# Patient Record
Sex: MALE | Race: WHITE | NOT HISPANIC OR LATINO | Employment: UNEMPLOYED | URBAN - METROPOLITAN AREA
[De-identification: names, ages, dates, MRNs, and addresses within clinical notes are randomized per-mention and may not be internally consistent; named-entity substitution may affect disease eponyms.]

---

## 2023-05-10 PROBLEM — E21.0 PRIMARY HYPERPARATHYROIDISM (MULTI): Status: ACTIVE | Noted: 2023-05-10

## 2023-05-10 PROBLEM — N43.3 HYDROCELE, LEFT: Status: ACTIVE | Noted: 2023-05-10

## 2023-05-10 PROBLEM — I10 BENIGN ESSENTIAL HYPERTENSION: Status: ACTIVE | Noted: 2023-05-10

## 2023-05-10 PROBLEM — E55.9 VITAMIN D DEFICIENCY: Status: ACTIVE | Noted: 2023-05-10

## 2023-05-10 PROBLEM — K21.9 GERD (GASTROESOPHAGEAL REFLUX DISEASE): Status: ACTIVE | Noted: 2023-05-10

## 2023-05-10 PROBLEM — M19.90 GENERALIZED ARTHRITIS: Status: ACTIVE | Noted: 2023-05-10

## 2023-05-10 PROBLEM — G47.00 INSOMNIA: Status: ACTIVE | Noted: 2023-05-10

## 2023-07-18 ENCOUNTER — LAB (OUTPATIENT)
Dept: LAB | Facility: LAB | Age: 38
End: 2023-07-18
Payer: COMMERCIAL

## 2023-07-18 ENCOUNTER — OFFICE VISIT (OUTPATIENT)
Dept: PRIMARY CARE | Facility: CLINIC | Age: 38
End: 2023-07-18
Payer: COMMERCIAL

## 2023-07-18 VITALS
HEIGHT: 67 IN | WEIGHT: 220 LBS | HEART RATE: 84 BPM | SYSTOLIC BLOOD PRESSURE: 124 MMHG | OXYGEN SATURATION: 98 % | DIASTOLIC BLOOD PRESSURE: 84 MMHG | BODY MASS INDEX: 34.53 KG/M2

## 2023-07-18 DIAGNOSIS — E21.0 PRIMARY HYPERPARATHYROIDISM (MULTI): ICD-10-CM

## 2023-07-18 DIAGNOSIS — I10 BENIGN ESSENTIAL HYPERTENSION: ICD-10-CM

## 2023-07-18 DIAGNOSIS — E55.9 VITAMIN D DEFICIENCY: ICD-10-CM

## 2023-07-18 DIAGNOSIS — K21.9 GASTROESOPHAGEAL REFLUX DISEASE WITHOUT ESOPHAGITIS: ICD-10-CM

## 2023-07-18 DIAGNOSIS — M19.90 GENERALIZED ARTHRITIS: ICD-10-CM

## 2023-07-18 DIAGNOSIS — I10 BENIGN ESSENTIAL HYPERTENSION: Primary | ICD-10-CM

## 2023-07-18 LAB
ALANINE AMINOTRANSFERASE (SGPT) (U/L) IN SER/PLAS: 147 U/L (ref 10–52)
ALBUMIN (G/DL) IN SER/PLAS: 4.5 G/DL (ref 3.4–5)
ALKALINE PHOSPHATASE (U/L) IN SER/PLAS: 87 U/L (ref 33–120)
ANION GAP IN SER/PLAS: 13 MMOL/L (ref 10–20)
ASPARTATE AMINOTRANSFERASE (SGOT) (U/L) IN SER/PLAS: 67 U/L (ref 9–39)
BILIRUBIN TOTAL (MG/DL) IN SER/PLAS: 1 MG/DL (ref 0–1.2)
CALCIUM (MG/DL) IN SER/PLAS: 10.4 MG/DL (ref 8.6–10.3)
CARBON DIOXIDE, TOTAL (MMOL/L) IN SER/PLAS: 28 MMOL/L (ref 21–32)
CHLORIDE (MMOL/L) IN SER/PLAS: 102 MMOL/L (ref 98–107)
CREATININE (MG/DL) IN SER/PLAS: 1.08 MG/DL (ref 0.5–1.3)
GFR MALE: 89 ML/MIN/1.73M2
GLUCOSE (MG/DL) IN SER/PLAS: 92 MG/DL (ref 74–99)
POTASSIUM (MMOL/L) IN SER/PLAS: 4.9 MMOL/L (ref 3.5–5.3)
PROTEIN TOTAL: 7.1 G/DL (ref 6.4–8.2)
SODIUM (MMOL/L) IN SER/PLAS: 138 MMOL/L (ref 136–145)
THYROTROPIN (MIU/L) IN SER/PLAS BY DETECTION LIMIT <= 0.05 MIU/L: 2.24 MIU/L (ref 0.44–3.98)
UREA NITROGEN (MG/DL) IN SER/PLAS: 14 MG/DL (ref 6–23)

## 2023-07-18 PROCEDURE — 3074F SYST BP LT 130 MM HG: CPT | Performed by: NURSE PRACTITIONER

## 2023-07-18 PROCEDURE — 3079F DIAST BP 80-89 MM HG: CPT | Performed by: NURSE PRACTITIONER

## 2023-07-18 PROCEDURE — 1036F TOBACCO NON-USER: CPT | Performed by: NURSE PRACTITIONER

## 2023-07-18 PROCEDURE — 84443 ASSAY THYROID STIM HORMONE: CPT

## 2023-07-18 PROCEDURE — 99214 OFFICE O/P EST MOD 30 MIN: CPT | Performed by: NURSE PRACTITIONER

## 2023-07-18 PROCEDURE — 80053 COMPREHEN METABOLIC PANEL: CPT

## 2023-07-18 PROCEDURE — 82306 VITAMIN D 25 HYDROXY: CPT

## 2023-07-18 PROCEDURE — 36415 COLL VENOUS BLD VENIPUNCTURE: CPT

## 2023-07-18 RX ORDER — LISINOPRIL 20 MG/1
20 TABLET ORAL DAILY
Qty: 90 TABLET | Refills: 2 | Status: SHIPPED | OUTPATIENT
Start: 2023-07-18 | End: 2024-01-08 | Stop reason: SDUPTHER

## 2023-07-18 RX ORDER — OMEPRAZOLE 40 MG/1
CAPSULE, DELAYED RELEASE ORAL
Qty: 90 CAPSULE | Refills: 2 | Status: SHIPPED | OUTPATIENT
Start: 2023-07-18 | End: 2024-01-08 | Stop reason: SDUPTHER

## 2023-07-18 ASSESSMENT — PATIENT HEALTH QUESTIONNAIRE - PHQ9
2. FEELING DOWN, DEPRESSED OR HOPELESS: NOT AT ALL
1. LITTLE INTEREST OR PLEASURE IN DOING THINGS: NOT AT ALL
SUM OF ALL RESPONSES TO PHQ9 QUESTIONS 1 AND 2: 0

## 2023-07-18 ASSESSMENT — ENCOUNTER SYMPTOMS
RESPIRATORY NEGATIVE: 1
PSYCHIATRIC NEGATIVE: 1
BACK PAIN: 1
CARDIOVASCULAR NEGATIVE: 1
NEUROLOGICAL NEGATIVE: 1
GASTROINTESTINAL NEGATIVE: 1
CONSTITUTIONAL NEGATIVE: 1

## 2023-07-18 ASSESSMENT — COLUMBIA-SUICIDE SEVERITY RATING SCALE - C-SSRS
2. HAVE YOU ACTUALLY HAD ANY THOUGHTS OF KILLING YOURSELF?: NO
1. IN THE PAST MONTH, HAVE YOU WISHED YOU WERE DEAD OR WISHED YOU COULD GO TO SLEEP AND NOT WAKE UP?: NO
6. HAVE YOU EVER DONE ANYTHING, STARTED TO DO ANYTHING, OR PREPARED TO DO ANYTHING TO END YOUR LIFE?: NO

## 2023-07-18 NOTE — PROGRESS NOTES
"Subjective   Patient ID: Manuel Humphreys is a 38 y.o. male who presents for Follow-up (6 month).    HTN- stable on medication  Arthritis- No flares, On Celebrex.   Reflux- On Prilosec, doing good. Eating foods high in acid like lasagna and pizza with no flare up.   Complains of increase in sweating without physical activity.   Going through a bad breakup.             Review of Systems   Constitutional: Negative.    HENT: Negative.     Respiratory: Negative.     Cardiovascular: Negative.    Gastrointestinal: Negative.    Musculoskeletal:  Positive for back pain.   Neurological: Negative.    Psychiatric/Behavioral: Negative.         Objective   /84   Pulse 84   Ht 1.702 m (5' 7\")   Wt 99.8 kg (220 lb)   SpO2 98%   BMI 34.46 kg/m²     Physical Exam  Vitals reviewed.   Constitutional:       Appearance: Normal appearance.   HENT:      Head: Normocephalic.   Cardiovascular:      Rate and Rhythm: Normal rate and regular rhythm.      Heart sounds: Normal heart sounds.   Pulmonary:      Effort: Pulmonary effort is normal.      Breath sounds: Normal breath sounds.   Skin:     General: Skin is warm.   Neurological:      General: No focal deficit present.      Mental Status: He is alert and oriented to person, place, and time.   Psychiatric:         Mood and Affect: Mood normal.         Behavior: Behavior normal.         Thought Content: Thought content normal.         Judgment: Judgment normal.         Assessment/Plan   Problem List Items Addressed This Visit       Benign essential hypertension - Primary     BP stable on current medications         Relevant Medications    lisinopril 20 mg tablet    Other Relevant Orders    Comprehensive Metabolic Panel    Generalized arthritis     On celebrex         GERD (gastroesophageal reflux disease)     Stable          Relevant Medications    omeprazole (PriLOSEC) 40 mg DR capsule    RESOLVED: Primary hyperparathyroidism (CMS/HCC)     Has had surgery by endocrinology.  No " current issues         Relevant Orders    Tsh With Reflex To Free T4 If Abnormal    Vitamin D deficiency    Relevant Orders    Vitamin D, Total     Reviewed above documentation by Deja Bruce RN NP student

## 2023-07-18 NOTE — PATIENT INSTRUCTIONS
Please continue to make sure you are eating a healthy well-balanced diet.    Refill sent on your medications    Make sure you are exercising routine exercise of 30 minutes of cardiovascular 4 to 5 days a week is recommended.      These complete blood work once completed we will notify you of the results    Advise to monitor mariajuana as this can increase versus decrease anxiety.

## 2023-07-19 LAB — CALCIDIOL (25 OH VITAMIN D3) (NG/ML) IN SER/PLAS: 82 NG/ML

## 2023-07-20 ENCOUNTER — TELEPHONE (OUTPATIENT)
Dept: PRIMARY CARE | Facility: CLINIC | Age: 38
End: 2023-07-20
Payer: COMMERCIAL

## 2023-07-20 DIAGNOSIS — R79.89 LFT ELEVATION: ICD-10-CM

## 2023-07-20 DIAGNOSIS — E55.9 VITAMIN D DEFICIENCY: Primary | ICD-10-CM

## 2023-07-20 RX ORDER — CYANOCOBALAMIN (VITAMIN B-12) 500 MCG
400 TABLET ORAL EVERY OTHER DAY
Qty: 45 TABLET | Refills: 1 | Status: SHIPPED | OUTPATIENT
Start: 2023-07-20 | End: 2024-01-08 | Stop reason: SDUPTHER

## 2023-07-20 NOTE — TELEPHONE ENCOUNTER
----- Message from ELKE Lee-CNP sent at 7/20/2023 10:20 AM EDT -----  Patient with significant elevation in his ALT. I would like to complete a hepatic panel in 2 weeks. Please advise him to watch medications over-the-counter such as Tylenol. Patient reports he was not drinking when I discussed this with him at his appointment. But is if he is drinking alcohol would advise him to avoid. We will recheck the blood work if continues we may need further work-up with an ultrasound of his liver. Vitamin D levels stable patient can take every other day at this time no longer needs high-dose we will send in over-the-counter 400 IUs for him to take every other day

## 2023-07-21 ENCOUNTER — TELEPHONE (OUTPATIENT)
Dept: PRIMARY CARE | Facility: CLINIC | Age: 38
End: 2023-07-21
Payer: COMMERCIAL

## 2023-09-08 ENCOUNTER — HOSPITAL ENCOUNTER (OUTPATIENT)
Dept: DATA CONVERSION | Facility: HOSPITAL | Age: 38
Discharge: HOME | End: 2023-09-08
Payer: COMMERCIAL

## 2023-09-08 ENCOUNTER — TELEPHONE (OUTPATIENT)
Dept: PRIMARY CARE | Facility: CLINIC | Age: 38
End: 2023-09-08
Payer: COMMERCIAL

## 2023-09-08 DIAGNOSIS — J32.9 CHRONIC SINUSITIS, UNSPECIFIED: ICD-10-CM

## 2023-09-08 DIAGNOSIS — Z20.822 CONTACT WITH AND (SUSPECTED) EXPOSURE TO COVID-19: ICD-10-CM

## 2023-09-08 DIAGNOSIS — F17.210 NICOTINE DEPENDENCE, CIGARETTES, UNCOMPLICATED: ICD-10-CM

## 2023-09-08 DIAGNOSIS — R05.9 COUGH, UNSPECIFIED: ICD-10-CM

## 2023-09-08 DIAGNOSIS — R51.9 HEADACHE, UNSPECIFIED: ICD-10-CM

## 2023-09-08 DIAGNOSIS — R09.81 NASAL CONGESTION: ICD-10-CM

## 2023-09-08 LAB
FLUAV RNA NPH QL NAA+PROBE: NEGATIVE
FLUBV RNA NPH QL NAA+PROBE: NEGATIVE
NOTE (COV): NORMAL
RSV RNA SPEC QL NAA+PROBE: NEGATIVE
SARS-COV-2 RNA RESP QL NAA+PROBE: NEGATIVE
SPECIMEN SOURCE (COV): NORMAL

## 2023-10-05 ENCOUNTER — OFFICE VISIT (OUTPATIENT)
Dept: PRIMARY CARE | Facility: CLINIC | Age: 38
End: 2023-10-05
Payer: COMMERCIAL

## 2023-10-05 VITALS
BODY MASS INDEX: 35.03 KG/M2 | SYSTOLIC BLOOD PRESSURE: 124 MMHG | DIASTOLIC BLOOD PRESSURE: 80 MMHG | WEIGHT: 223.2 LBS | HEIGHT: 67 IN

## 2023-10-05 DIAGNOSIS — L30.1 DYSHIDROTIC ECZEMA: Primary | ICD-10-CM

## 2023-10-05 DIAGNOSIS — Z23 NEED FOR VACCINATION: ICD-10-CM

## 2023-10-05 PROBLEM — M54.31 SCIATICA OF RIGHT SIDE: Status: RESOLVED | Noted: 2023-10-05 | Resolved: 2023-10-05

## 2023-10-05 PROBLEM — E83.51 HYPOCALCEMIA: Status: RESOLVED | Noted: 2023-10-05 | Resolved: 2023-10-05

## 2023-10-05 PROBLEM — F19.11 SUBSTANCE ABUSE IN REMISSION (MULTI): Status: RESOLVED | Noted: 2023-10-05 | Resolved: 2023-10-05

## 2023-10-05 PROBLEM — D35.1 PARATHYROID ADENOMA: Status: RESOLVED | Noted: 2023-10-05 | Resolved: 2023-10-05

## 2023-10-05 PROBLEM — R51.9 HEADACHE: Status: RESOLVED | Noted: 2023-10-05 | Resolved: 2023-10-05

## 2023-10-05 PROBLEM — E83.52 HYPERCALCEMIA: Status: RESOLVED | Noted: 2023-10-05 | Resolved: 2023-10-05

## 2023-10-05 PROBLEM — N52.9 ERECTILE DYSFUNCTION: Status: ACTIVE | Noted: 2023-10-05

## 2023-10-05 PROCEDURE — 3079F DIAST BP 80-89 MM HG: CPT | Performed by: NURSE PRACTITIONER

## 2023-10-05 PROCEDURE — 3074F SYST BP LT 130 MM HG: CPT | Performed by: NURSE PRACTITIONER

## 2023-10-05 PROCEDURE — 1036F TOBACCO NON-USER: CPT | Performed by: NURSE PRACTITIONER

## 2023-10-05 PROCEDURE — 90471 IMMUNIZATION ADMIN: CPT | Performed by: NURSE PRACTITIONER

## 2023-10-05 PROCEDURE — 99213 OFFICE O/P EST LOW 20 MIN: CPT | Performed by: NURSE PRACTITIONER

## 2023-10-05 PROCEDURE — 90686 IIV4 VACC NO PRSV 0.5 ML IM: CPT | Performed by: NURSE PRACTITIONER

## 2023-10-05 RX ORDER — TRIAMCINOLONE ACETONIDE 1 MG/G
CREAM TOPICAL 2 TIMES DAILY
Qty: 15 G | Refills: 0 | Status: SHIPPED | OUTPATIENT
Start: 2023-10-05 | End: 2024-01-08 | Stop reason: ALTCHOICE

## 2023-10-05 RX ORDER — ASPIRIN 81 MG
5 TABLET, DELAYED RELEASE (ENTERIC COATED) ORAL 2 TIMES DAILY
COMMUNITY
Start: 2021-02-19 | End: 2024-01-08 | Stop reason: ALTCHOICE

## 2023-10-05 ASSESSMENT — ENCOUNTER SYMPTOMS
VOMITING: 0
NAIL CHANGES: 0
FATIGUE: 0
EYE PAIN: 0
SORE THROAT: 0
SHORTNESS OF BREATH: 0
ANOREXIA: 0
COUGH: 0
DIARRHEA: 0
FEVER: 0
RHINORRHEA: 0

## 2023-10-05 ASSESSMENT — COLUMBIA-SUICIDE SEVERITY RATING SCALE - C-SSRS
1. IN THE PAST MONTH, HAVE YOU WISHED YOU WERE DEAD OR WISHED YOU COULD GO TO SLEEP AND NOT WAKE UP?: NO
6. HAVE YOU EVER DONE ANYTHING, STARTED TO DO ANYTHING, OR PREPARED TO DO ANYTHING TO END YOUR LIFE?: NO
2. HAVE YOU ACTUALLY HAD ANY THOUGHTS OF KILLING YOURSELF?: NO

## 2023-10-05 NOTE — PATIENT INSTRUCTIONS
Dyshidrotic eczema, also known as pompholyx eczema, is a skin condition that causes small, itchy blisters to develop on the palms of the hands and the soles of the feet. To treat this condition, your healthcare provider is prescribing triamcinolone, which is a topical corticosteroid medication used to reduce inflammation and relieve itching. Apply the medication as directed by your healthcare provider to help manage the symptoms of dyshidrotic eczema.

## 2023-10-05 NOTE — PROGRESS NOTES
"Subjective   Patient ID: Manuel Humphreys is a 38 y.o. male who presents for Rash (On left hand).    Rash  The affected locations include the left fingers. The rash is characterized by blistering. He was exposed to nothing. Pertinent negatives include no anorexia, congestion, cough, diarrhea, eye pain, facial edema, fatigue, fever, joint pain, nail changes, rhinorrhea, shortness of breath, sore throat or vomiting. Past treatments include nothing. The treatment provided no relief.        Review of Systems   Constitutional:  Negative for fatigue and fever.   HENT:  Negative for congestion, rhinorrhea and sore throat.    Eyes:  Negative for pain.   Respiratory:  Negative for cough and shortness of breath.    Gastrointestinal:  Negative for anorexia, diarrhea and vomiting.   Musculoskeletal:  Negative for joint pain.   Skin:  Positive for rash. Negative for nail changes.       Objective   /80   Ht 1.702 m (5' 7\")   Wt 101 kg (223 lb 3.2 oz)   BMI 34.96 kg/m²     Physical Exam  Constitutional:       Appearance: Normal appearance.   Pulmonary:      Effort: Pulmonary effort is normal.   Skin:     Capillary Refill: Capillary refill takes less than 2 seconds.      Comments: Finger at first knuckle with 2 raised areas. No signs or symptoms of infection    Neurological:      General: No focal deficit present.      Mental Status: He is alert.         Assessment/Plan   Problem List Items Addressed This Visit    None  Visit Diagnoses         Codes    Dyshidrotic eczema    -  Primary L30.1    Relevant Medications    triamcinolone (Kenalog) 0.1 % cream    Need for vaccination     Z23    Relevant Orders    Flu vaccine (IIV4) age 6 months and greater, preservative free               "

## 2024-01-08 ENCOUNTER — LAB (OUTPATIENT)
Dept: LAB | Facility: LAB | Age: 39
End: 2024-01-08
Payer: COMMERCIAL

## 2024-01-08 ENCOUNTER — OFFICE VISIT (OUTPATIENT)
Dept: PRIMARY CARE | Facility: CLINIC | Age: 39
End: 2024-01-08
Payer: COMMERCIAL

## 2024-01-08 VITALS
WEIGHT: 227.13 LBS | BODY MASS INDEX: 36.5 KG/M2 | SYSTOLIC BLOOD PRESSURE: 135 MMHG | HEART RATE: 83 BPM | OXYGEN SATURATION: 97 % | DIASTOLIC BLOOD PRESSURE: 95 MMHG | HEIGHT: 66 IN

## 2024-01-08 DIAGNOSIS — G89.29 CHRONIC PAIN OF RIGHT KNEE: ICD-10-CM

## 2024-01-08 DIAGNOSIS — I10 ESSENTIAL HYPERTENSION: Primary | ICD-10-CM

## 2024-01-08 DIAGNOSIS — R74.8 ELEVATED LIVER ENZYMES: ICD-10-CM

## 2024-01-08 DIAGNOSIS — E83.52 HYPERCALCEMIA: ICD-10-CM

## 2024-01-08 DIAGNOSIS — M25.561 CHRONIC PAIN OF RIGHT KNEE: ICD-10-CM

## 2024-01-08 DIAGNOSIS — E55.9 VITAMIN D DEFICIENCY: ICD-10-CM

## 2024-01-08 DIAGNOSIS — K21.9 GASTROESOPHAGEAL REFLUX DISEASE WITHOUT ESOPHAGITIS: ICD-10-CM

## 2024-01-08 DIAGNOSIS — M19.90 GENERALIZED ARTHRITIS: ICD-10-CM

## 2024-01-08 DIAGNOSIS — M67.40 GANGLION CYST: ICD-10-CM

## 2024-01-08 LAB
ALBUMIN SERPL BCP-MCNC: 4.5 G/DL (ref 3.4–5)
ALP SERPL-CCNC: 88 U/L (ref 33–120)
ALT SERPL W P-5'-P-CCNC: 81 U/L (ref 10–52)
ANION GAP SERPL CALC-SCNC: 12 MMOL/L (ref 10–20)
AST SERPL W P-5'-P-CCNC: 36 U/L (ref 9–39)
BILIRUB SERPL-MCNC: 0.5 MG/DL (ref 0–1.2)
BUN SERPL-MCNC: 16 MG/DL (ref 6–23)
CA-I BLD-SCNC: 1.4 MMOL/L (ref 1.1–1.33)
CALCIUM SERPL-MCNC: 10.7 MG/DL (ref 8.6–10.3)
CHLORIDE SERPL-SCNC: 105 MMOL/L (ref 98–107)
CO2 SERPL-SCNC: 30 MMOL/L (ref 21–32)
CREAT SERPL-MCNC: 0.99 MG/DL (ref 0.5–1.3)
EGFRCR SERPLBLD CKD-EPI 2021: >90 ML/MIN/1.73M*2
GLUCOSE SERPL-MCNC: 99 MG/DL (ref 74–99)
POTASSIUM SERPL-SCNC: 4.9 MMOL/L (ref 3.5–5.3)
PROT SERPL-MCNC: 6.8 G/DL (ref 6.4–8.2)
SODIUM SERPL-SCNC: 142 MMOL/L (ref 136–145)

## 2024-01-08 PROCEDURE — 3075F SYST BP GE 130 - 139MM HG: CPT | Performed by: FAMILY MEDICINE

## 2024-01-08 PROCEDURE — 80053 COMPREHEN METABOLIC PANEL: CPT

## 2024-01-08 PROCEDURE — 99214 OFFICE O/P EST MOD 30 MIN: CPT | Performed by: FAMILY MEDICINE

## 2024-01-08 PROCEDURE — 82330 ASSAY OF CALCIUM: CPT

## 2024-01-08 PROCEDURE — 36415 COLL VENOUS BLD VENIPUNCTURE: CPT

## 2024-01-08 PROCEDURE — 3080F DIAST BP >= 90 MM HG: CPT | Performed by: FAMILY MEDICINE

## 2024-01-08 RX ORDER — CELECOXIB 200 MG/1
200 CAPSULE ORAL 2 TIMES DAILY PRN
Qty: 180 CAPSULE | Refills: 0 | Status: SHIPPED | OUTPATIENT
Start: 2024-01-08 | End: 2024-05-20 | Stop reason: ALTCHOICE

## 2024-01-08 RX ORDER — OMEPRAZOLE 40 MG/1
CAPSULE, DELAYED RELEASE ORAL
Qty: 90 CAPSULE | Refills: 0 | Status: SHIPPED | OUTPATIENT
Start: 2024-01-08

## 2024-01-08 RX ORDER — CYANOCOBALAMIN (VITAMIN B-12) 500 MCG
400 TABLET ORAL EVERY OTHER DAY
Qty: 45 TABLET | Refills: 3 | Status: SHIPPED | OUTPATIENT
Start: 2024-01-08

## 2024-01-08 RX ORDER — LISINOPRIL 20 MG/1
20 TABLET ORAL DAILY
Qty: 90 TABLET | Refills: 3 | Status: SHIPPED | OUTPATIENT
Start: 2024-01-08

## 2024-01-08 ASSESSMENT — ENCOUNTER SYMPTOMS
BLOOD IN STOOL: 0
UNEXPECTED WEIGHT CHANGE: 0
WHEEZING: 0
COUGH: 0
NUMBNESS: 0
CONFUSION: 0
SHORTNESS OF BREATH: 0
DIZZINESS: 0
NAUSEA: 0
WEAKNESS: 0
LIGHT-HEADEDNESS: 0
ABDOMINAL PAIN: 0
CHILLS: 0
DYSURIA: 0
LOSS OF SENSATION IN FEET: 0
FEVER: 0
VOMITING: 0
TROUBLE SWALLOWING: 0
DIARRHEA: 0
DEPRESSION: 1
OCCASIONAL FEELINGS OF UNSTEADINESS: 0
DIFFICULTY URINATING: 0

## 2024-01-08 ASSESSMENT — PATIENT HEALTH QUESTIONNAIRE - PHQ9
10. IF YOU CHECKED OFF ANY PROBLEMS, HOW DIFFICULT HAVE THESE PROBLEMS MADE IT FOR YOU TO DO YOUR WORK, TAKE CARE OF THINGS AT HOME, OR GET ALONG WITH OTHER PEOPLE: NOT DIFFICULT AT ALL
1. LITTLE INTEREST OR PLEASURE IN DOING THINGS: SEVERAL DAYS
SUM OF ALL RESPONSES TO PHQ9 QUESTIONS 1 AND 2: 2
2. FEELING DOWN, DEPRESSED OR HOPELESS: SEVERAL DAYS

## 2024-01-08 NOTE — PROGRESS NOTES
"Subjective   Patient ID: Manuel Humphreys is a 38 y.o. male who presents for Follow-up (Pt presents for 6mo F/U, c/o what pt states is a cyst on L pointer finger, rx's needed.BL).  HPI    Generally feeling well. 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    Has the thought that Jillian Freed left because of something that he did, but recognizes that this thinking is erroneous. Is working with a therapist. Not currently seeing a psychiatrist. Previously saw psychiatry, reports was prescribed Zoloft.    Review of Systems   Constitutional:  Negative for chills, fever and unexpected weight change.   HENT:  Negative for ear pain and trouble swallowing.    Respiratory:  Negative for cough, shortness of breath and wheezing.    Cardiovascular:  Negative for chest pain.   Gastrointestinal:  Negative for abdominal pain, blood in stool, diarrhea, nausea and vomiting.   Genitourinary:  Negative for difficulty urinating and dysuria.   Skin:  Negative for rash.   Neurological:  Negative for dizziness, syncope, weakness, light-headedness and numbness.   Psychiatric/Behavioral:  Negative for behavioral problems and confusion.        Objective   BP (!) 135/95   Pulse 83   Ht 1.67 m (5' 5.75\")   Wt 103 kg (227 lb 2 oz)   SpO2 97%   BMI 36.94 kg/m²     Physical Exam  Vitals and nursing note reviewed.   Constitutional:       General: He is not in acute distress.     Appearance: Normal appearance.   HENT:      Head: Normocephalic and atraumatic.   Eyes:      General: No scleral icterus.     Extraocular Movements: Extraocular movements intact.      Conjunctiva/sclera: Conjunctivae normal.   Pulmonary:      Effort: Pulmonary effort is normal. No respiratory distress.   Musculoskeletal:      Left hand: Deformity (left index over the dorsal PIP small ganglion cyst) present.   Skin:     General: Skin is warm and dry.      Coloration: Skin is not jaundiced.   Neurological:      Mental Status: He is " alert and oriented to person, place, and time. Mental status is at baseline.   Psychiatric:         Mood and Affect: Affect normal.         Behavior: Behavior normal.         Thought Content: Thought content is paranoid (feels that Jillian Freed left because of him or something he did; with good insight). Thought content does not include homicidal (like murder mysteries, mentions that murder with insulin might allow someone to get away with it) or suicidal ideation.         Assessment/Plan   Problem List Items Addressed This Visit       Essential hypertension - Primary     Borderline control. Monitor.         Relevant Medications    lisinopril 20 mg tablet    Generalized arthritis    Relevant Medications    celecoxib (CeleBREX) 200 mg capsule    GERD (gastroesophageal reflux disease)    Relevant Medications    omeprazole (PriLOSEC) 40 mg DR capsule    Vitamin D deficiency    Relevant Medications    cholecalciferol (Vitamin D3) 10 MCG (400 UNIT) tablet    Hypercalcemia    Relevant Orders    Calcium, ionized    Elevated liver enzymes    Relevant Orders    Comprehensive Metabolic Panel    Comprehensive Metabolic Panel    Ganglion cyst    Relevant Orders    Referral to Orthopaedic Surgery    Chronic pain of right knee    Relevant Orders    Referral to Orthopaedic Surgery

## 2024-01-08 NOTE — PATIENT INSTRUCTIONS
Strongly advise strict avoidance of marijuana and THC.    Hand Surgery  Dr. Wenceslao Torres ([place]) [phone]  [facility] [phone]     Orthopedic Surgery  Dr. Edward Reeder (shoulder, elbow, knee), Dr. Jerry Leyva (hip, knee), Lianne Eaton PA-C [phone]  Dr. Bruno Huynh (trauma, oncology) [phone]  Dr. Marques Patel [phone]  Dr. Ken Garcia (sports med) [phone]    [facility] [phone]  Dr. Russell Kent (sports med)  Dr. Bc West (sports med)    Monitor your resting blood pressure (BP) and heart rate (HR) at home. Resting BP should be fairly consistently better than 140/90, preferably better than 130/80.  For a list of validated BP cuffs: https://www.validatebp.org/     Highlights of Adverse Effects Associated with NSAIDs, e.g., Celebrex/celecoxib  (Non-Steroidal Anti-Inflammatory Drugs)  Heart attack, heart failure, stroke, kidney injury or failure, liver injury or failure, gastritis, gastrointestinal bleeding, other bleeding, effects on blood counts, lung disease.  Avoid NSAIDs until you have normal enzymes. Checking today, and in 3 months.    The combination of Lisinopril and NSAID can increase risk to the kidneys.    Proton Pump Inhibitors (PPI, e.g., Prilosec/omeprazole, Nexium/esomeprazole, and others) may cause vitamin or mineral deficiencies, kidney damage, dementia, stomach polyps, fractures and thinning of the bones (osteoporosis), intestinal infections including C. diff., lupus. Some of these side effects are more likely with chronic use. There are other possible side effects, and it is recommended, as with any medication, to review all possible side effects.        Please return for a(n) blood pressure, depression, liver enzymes follow-up appointment in 3 months, after tests to review results and options, earlier if any question or  concern.    Avoid taking Biotin for a week prior to any blood tests, as it can interfere with certain results.  Fasting for labs means 12 hours, nothing to eat or drink, except water and medications, unless directed otherwise.    For assistance with scheduling referrals or consultations, please call [phone]. For laboratory, radiology, and other tests, please call [phone] ([phone] for pediatrics). Please review prescription inserts and published information for possible adverse effects of all medications. Return after testing or consultation to review results and recommendations, if symptoms persist, change, worsen, or return, or if you have any question or concern. If you do not get results within 7-10 days, or you have any question or concern, please send a message, call the office ([phone]), or return to the office for a follow-up appointment. For non-emergencies, you may call the office. For emergencies, call 9-1-1 or go to the nearest Emergency Department. Please schedule additional appointment(s) to address concern(s) not addressed today.    In general, results are not released or discussed over the telephone, but at an appointment or via  [facility]. Results of tests done through [facility] are released via  [facility] (see below).  [email or web address]   [employer] support line: [phone]

## 2024-01-10 ENCOUNTER — TELEPHONE (OUTPATIENT)
Dept: PRIMARY CARE | Facility: CLINIC | Age: 39
End: 2024-01-10
Payer: COMMERCIAL

## 2024-01-10 DIAGNOSIS — E83.52 HYPERCALCEMIA: Primary | ICD-10-CM

## 2024-01-10 NOTE — TELEPHONE ENCOUNTER
Result Communication    Resulted Orders   Comprehensive Metabolic Panel   Result Value Ref Range    Glucose 99 74 - 99 mg/dL    Sodium 142 136 - 145 mmol/L    Potassium 4.9 3.5 - 5.3 mmol/L    Chloride 105 98 - 107 mmol/L    Bicarbonate 30 21 - 32 mmol/L    Anion Gap 12 10 - 20 mmol/L    Urea Nitrogen 16 6 - 23 mg/dL    Creatinine 0.99 0.50 - 1.30 mg/dL    eGFR >90 >60 mL/min/1.73m*2      Comment:      Calculations of estimated GFR are performed using the 2021 CKD-EPI Study Refit equation without the race variable for the IDMS-Traceable creatinine methods.  https://jasn.asnjournals.org/content/early/2021/09/22/ASN.8745484485    Calcium 10.7 (H) 8.6 - 10.3 mg/dL    Albumin 4.5 3.4 - 5.0 g/dL    Alkaline Phosphatase 88 33 - 120 U/L    Total Protein 6.8 6.4 - 8.2 g/dL    AST 36 9 - 39 U/L    Bilirubin, Total 0.5 0.0 - 1.2 mg/dL    ALT 81 (H) 10 - 52 U/L      Comment:      Patients treated with Sulfasalazine may generate falsely decreased results for ALT.   Calcium, ionized   Result Value Ref Range    POCT Calcium, Ionized 1.40 (H) 1.1 - 1.33 mmol/L      Comment:      The performance characteristics of ionized calcium tested  in heparinized plasma or serum have been validated by the  individual  laboratory site where testing is performed.   Testing on heparinized plasma or serum is not approved by   the FDA; however, such approval is not necessary.       11:41 AM      Results were successfully communicated with the patient and they acknowledged their understanding.

## 2024-01-10 NOTE — TELEPHONE ENCOUNTER
Result Communication    Resulted Orders   Comprehensive Metabolic Panel   Result Value Ref Range    Glucose 99 74 - 99 mg/dL    Sodium 142 136 - 145 mmol/L    Potassium 4.9 3.5 - 5.3 mmol/L    Chloride 105 98 - 107 mmol/L    Bicarbonate 30 21 - 32 mmol/L    Anion Gap 12 10 - 20 mmol/L    Urea Nitrogen 16 6 - 23 mg/dL    Creatinine 0.99 0.50 - 1.30 mg/dL    eGFR >90 >60 mL/min/1.73m*2      Comment:      Calculations of estimated GFR are performed using the 2021 CKD-EPI Study Refit equation without the race variable for the IDMS-Traceable creatinine methods.  https://jasn.asnjournals.org/content/early/2021/09/22/ASN.8715015250    Calcium 10.7 (H) 8.6 - 10.3 mg/dL    Albumin 4.5 3.4 - 5.0 g/dL    Alkaline Phosphatase 88 33 - 120 U/L    Total Protein 6.8 6.4 - 8.2 g/dL    AST 36 9 - 39 U/L    Bilirubin, Total 0.5 0.0 - 1.2 mg/dL    ALT 81 (H) 10 - 52 U/L      Comment:      Patients treated with Sulfasalazine may generate falsely decreased results for ALT.   Calcium, ionized   Result Value Ref Range    POCT Calcium, Ionized 1.40 (H) 1.1 - 1.33 mmol/L      Comment:      The performance characteristics of ionized calcium tested  in heparinized plasma or serum have been validated by the  individual  laboratory site where testing is performed.   Testing on heparinized plasma or serum is not approved by   the FDA; however, such approval is not necessary.       11:37 AM      Results were successfully communicated with the patient and they acknowledged their understanding.

## 2024-01-10 NOTE — TELEPHONE ENCOUNTER
----- Message from Surya Kaur DO sent at 1/10/2024  8:34 AM EST -----  Please let patient know that his ionized calcium is slightly elevated.  His liver enzyme ALT is still mildly elevated, but a little better than last time, and his liver enzyme AST has normalized.    Recommend rechecking the ionized calcium along with intact PTH (parathyroid hormone).    Recommend a low-sugar, low-simple-carbohydrate, low-fat, heart-healthy diet, weight, and lifestyle, and strictly avoiding alcohol, NSAIDs, and Acetaminophen. Recheck liver enzymes in 1 month.

## 2024-01-18 ENCOUNTER — APPOINTMENT (OUTPATIENT)
Dept: PRIMARY CARE | Facility: CLINIC | Age: 39
End: 2024-01-18
Payer: COMMERCIAL

## 2024-02-13 ENCOUNTER — HOSPITAL ENCOUNTER (OUTPATIENT)
Dept: RADIOLOGY | Facility: HOSPITAL | Age: 39
Discharge: HOME | End: 2024-02-13
Payer: COMMERCIAL

## 2024-02-13 ENCOUNTER — OFFICE VISIT (OUTPATIENT)
Dept: ORTHOPEDIC SURGERY | Facility: CLINIC | Age: 39
End: 2024-02-13
Payer: COMMERCIAL

## 2024-02-13 DIAGNOSIS — M25.562 PAIN OF LEFT PATELLA: ICD-10-CM

## 2024-02-13 DIAGNOSIS — G89.29 CHRONIC PAIN OF LEFT KNEE: Primary | ICD-10-CM

## 2024-02-13 DIAGNOSIS — G89.29 CHRONIC PAIN OF LEFT KNEE: ICD-10-CM

## 2024-02-13 DIAGNOSIS — M25.562 CHRONIC PAIN OF LEFT KNEE: Primary | ICD-10-CM

## 2024-02-13 DIAGNOSIS — M25.562 CHRONIC PAIN OF LEFT KNEE: ICD-10-CM

## 2024-02-13 PROCEDURE — 99204 OFFICE O/P NEW MOD 45 MIN: CPT | Performed by: ORTHOPAEDIC SURGERY

## 2024-02-13 PROCEDURE — 73564 X-RAY EXAM KNEE 4 OR MORE: CPT | Mod: LT

## 2024-02-13 RX ORDER — MELOXICAM 15 MG/1
15 TABLET ORAL DAILY
Qty: 60 TABLET | Refills: 0 | Status: SHIPPED | OUTPATIENT
Start: 2024-02-13 | End: 2024-04-13

## 2024-02-13 NOTE — PROGRESS NOTES
This is a consultation from Dr. Surya Kaur DO for   Chief Complaint   Patient presents with    Left Knee - Pain       This is a 38 y.o. male who presents for evaluation of left knee pain.,  This is a new issue for the patient.  Is been going on for a couple months.  Complains of pain mainly over the front of the knee.  It is worse when he goes up and down inclines and bends his knee deeply.  He has never had surgery or injections.  No previous injuries or change in activity that precipitated this.  He has tried over-the-counter anti-inflammatories not helping much.  Has not done therapy or worn a brace.    Physical Exam    There has been no interval change in this patient's past medical, surgical, medications, allergies, family history or social history since the most recent visit to a provider within our department. 14 point review of systems was performed, reviewed, and negative except for pertinent positives documented in the history of present illness.     Constitutional: well developed, well nourished male in no acute distress  Psychiatric: normal mood, appropriate affect  Eyes: sclera anicteric  HENT: normocephalic/atraumatic  CV: regular rate and rhythm   Respiratory: non labored breathing  Integumentary: no rash  Neurological: moves all extremities    Left knee exam: skin intact no lacerations or abrations. no effusion. nttp joint line. negative log roll negative patellar grind. ROM 0-120. stable to varus and valgus stress at 0 and 30 degrees. negative lachman negative posterior drawer negative sheri. 5/5 ehl/fhl/gs/ta. silt s/s/sp/dp/t. 2+ dp/pt        Xrays were ordered by me, they were reviewed and independently interpreted by me today, they show minimal degenerative changes no fracture no dislocation    Procedures      Impression/Plan: This is a 38 y.o. male with left knee pain consistent with patellofemoral syndrome.  I discussed this with him in detail including the clinical radiographic  "findings treatment options risks and benefits.  I recommend initial management anti-inflammatories physical therapy focused on the quad.  Patellar stabilizing brace.  I will see him back as needed    BMI Readings from Last 1 Encounters:   01/08/24 36.94 kg/m²      Lab Results   Component Value Date    CREATININE 0.99 01/08/2024     Tobacco Use: High Risk (2/13/2024)    Patient History     Smoking Tobacco Use: Every Day     Smokeless Tobacco Use: Former     Passive Exposure: Not on file      Computed MELD 3.0 unavailable. Necessary lab results were not found in the last year.  Computed MELD-Na unavailable. Necessary lab results were not found in the last year.       Lab Results   Component Value Date    HGBA1C 5.1 01/09/2023     No results found for: \"STAPHMRSASCR\"  "

## 2024-02-13 NOTE — LETTER
February 13, 2024     Surya Kaur DO  [address]  [address]  [facility] [address]    Patient: Manuel Humphreys   YOB: 1985   Date of Visit: 2/13/2024       Dear Dr. Surya Kaur DO:    Thank you for referring Manuel Humphreys to me for evaluation. Below are my notes for this consultation.  If you have questions, please do not hesitate to call me. I look forward to following your patient along with you.       Sincerely,     Jerry Lyeva MD      CC: No Recipients  ______________________________________________________________________________________    This is a consultation from Dr. Surya Kaur DO for   Chief Complaint   Patient presents with   • Left Knee - Pain       This is a 38 y.o. male who presents for evaluation of left knee pain.,  This is a new issue for the patient.  Is been going on for a couple months.  Complains of pain mainly over the front of the knee.  It is worse when he goes up and down inclines and bends his knee deeply.  He has never had surgery or injections.  No previous injuries or change in activity that precipitated this.  He has tried over-the-counter anti-inflammatories not helping much.  Has not done therapy or worn a brace.    Physical Exam    There has been no interval change in this patient's past medical, surgical, medications, allergies, family history or social history since the most recent visit to a provider within our department. 14 point review of systems was performed, reviewed, and negative except for pertinent positives documented in the history of present illness.     Constitutional: well developed, well nourished male in no acute distress  Psychiatric: normal mood, appropriate affect  Eyes: sclera anicteric  HENT: normocephalic/atraumatic  CV: regular rate and rhythm   Respiratory: non labored breathing  Integumentary: no rash  Neurological: moves all extremities    Left knee exam: skin intact no lacerations or abrations. no effusion.  "nttp joint line. negative log roll negative patellar grind. ROM 0-120. stable to varus and valgus stress at 0 and 30 degrees. negative lachman negative posterior drawer negative sheri. 5/5 ehl/fhl/gs/ta. silt s/s/sp/dp/t. 2+ dp/pt        Xrays were ordered by me, they were reviewed and independently interpreted by me today, they show minimal degenerative changes no fracture no dislocation    Procedures      Impression/Plan: This is a 38 y.o. male with left knee pain consistent with patellofemoral syndrome.  I discussed this with him in detail including the clinical radiographic findings treatment options risks and benefits.  I recommend initial management anti-inflammatories physical therapy focused on the quad.  Patellar stabilizing brace.  I will see him back as needed    BMI Readings from Last 1 Encounters:   01/08/24 36.94 kg/m²      Lab Results   Component Value Date    CREATININE 0.99 01/08/2024     Tobacco Use: High Risk (2/13/2024)    Patient History    • Smoking Tobacco Use: Every Day    • Smokeless Tobacco Use: Former    • Passive Exposure: Not on file      Computed MELD 3.0 unavailable. Necessary lab results were not found in the last year.  Computed MELD-Na unavailable. Necessary lab results were not found in the last year.       Lab Results   Component Value Date    HGBA1C 5.1 01/09/2023     No results found for: \"STAPHMRSASCR\"  "

## 2024-02-19 ENCOUNTER — OFFICE VISIT (OUTPATIENT)
Dept: ORTHOPEDIC SURGERY | Facility: CLINIC | Age: 39
End: 2024-02-19
Payer: COMMERCIAL

## 2024-02-19 ENCOUNTER — HOSPITAL ENCOUNTER (OUTPATIENT)
Dept: RADIOLOGY | Facility: HOSPITAL | Age: 39
Discharge: HOME | End: 2024-02-19
Payer: COMMERCIAL

## 2024-02-19 DIAGNOSIS — R22.32 FINGER MASS, LEFT: Primary | ICD-10-CM

## 2024-02-19 DIAGNOSIS — R22.32 FINGER MASS, LEFT: ICD-10-CM

## 2024-02-19 PROCEDURE — 73140 X-RAY EXAM OF FINGER(S): CPT | Mod: LT

## 2024-02-19 PROCEDURE — 99204 OFFICE O/P NEW MOD 45 MIN: CPT | Performed by: ORTHOPAEDIC SURGERY

## 2024-02-19 PROCEDURE — 73140 X-RAY EXAM OF FINGER(S): CPT | Mod: LEFT SIDE | Performed by: RADIOLOGY

## 2024-02-20 NOTE — H&P (VIEW-ONLY)
History of Present Illness:  Chief Complaint   Patient presents with    Left Hand - Mass     Left index finger mass        Patient presents today for evaluation of left index finger mass that he first noticed about 4 to 5 months ago.  This mass is about the dorsal aspect of his index finger PIP joint.  He believes that burst did at some point, but recurred quickly after this happened.  He notes that it is somewhat bothersome and describes some itchiness overlying the area.  No specific pain.  No numbness or tingling.  No known injuries.    Past Medical History:   Diagnosis Date    Headache 10/05/2023    Hypercalcemia 10/05/2023    Hypocalcemia 10/05/2023    Parathyroid adenoma 10/05/2023    Personal history of (healed) traumatic fracture     History of fracture of ankle    Primary hyperparathyroidism (CMS/HCC) 05/10/2023    Sciatica of right side 10/05/2023       Medication Documentation Review Audit       Reviewed by Pina Palmer MA (Medical Assistant) on 02/19/24 at 0956      Medication Order Taking? Sig Documenting Provider Last Dose Status   celecoxib (CeleBREX) 200 mg capsule [id number]  Take 1 capsule (200 mg) by mouth 2 times a day as needed for moderate pain (4 - 6). Surya Kaur DO  Active   cholecalciferol (Vitamin D3) 10 MCG (400 UNIT) tablet [id number]  Take 1 tablet (10 mcg) by mouth every other day. Surya Kaur DO  Active   lisinopril 20 mg tablet [id number]  Take 1 tablet (20 mg) by mouth once daily. for blood pressure Surya Kaur DO  Active   melatonin 3 mg tablet [id number] No take one tablet nightly as needed for sleep Historical Provider, MD Taking Active   meloxicam (Mobic) 15 mg tablet [id number]  Take 1 tablet (15 mg) by mouth once daily. Jerry Leyva MD  Active   omeprazole (PriLOSEC) 40 mg DR capsule [id number]  TAKE 1 CAPSULE BY MOUTH ONCE DAILY AS NEEDED Surya Kaur DO  Active                    No Known Allergies    Social History     Socioeconomic History     Marital status: Single     Spouse name: Not on file    Number of children: Not on file    Years of education: Not on file    Highest education level: Not on file   Occupational History    Not on file   Tobacco Use    Smoking status: Every Day     Types: Cigarettes     Last attempt to quit: 2022     Years since quittin.5    Smokeless tobacco: Former   Vaping Use    Vaping Use: Former   Substance and Sexual Activity    Alcohol use: Yes     Comment: rarely    Drug use: Not Currently     Types: Marijuana    Sexual activity: Not on file   Other Topics Concern    Not on file   Social History Narrative    Not on file     Social Determinants of Health     Financial Resource Strain: Not on file   Food Insecurity: Not on file   Transportation Needs: Not on file   Physical Activity: Not on file   Stress: Not on file   Social Connections: Not on file   Intimate Partner Violence: Not on file   Housing Stability: Not on file       Past Surgical History:   Procedure Laterality Date    ANKLE ARTHROSCOPY W/ OPEN REPAIR  2017    Ankle Repair        Review of Systems   GENERAL: Negative for malaise, significant weight loss, fever  MUSCULOSKELETAL: see HPI  NEURO:  Negative     Physical Examination  Constitutional: Appears well-developed and well-nourished.  Head: Normocephalic and atraumatic.  Eyes: EOMI grossly  Cardiovascular: Intact distal pulses.   Respiratory: Effort normal. No respiratory distress.  Neurologic: Alert and oriented to person, place, and time.  Skin: Skin is warm and dry.  Hematologic / Lymphatic: No lymphedema, lymphangitis.  Psychiatric: normal mood and affect. Behavior is normal.   Musculoskeletal:  Left index finger: Mass about the dorsal aspect of the PIP joint most consistent with ganglion cyst.  This measures approximately 4 x 4 mm.  Slightly firm.  Skin is easily mobile over the mass.  Capillary refill less than 2 seconds distally.  FDS/FDP and terminal extensor intact.  Negative  Delaney.    Radiographs: Left index finger radiographs ordered and available for my review: No fracture/dislocation.  Joint spaces maintained.     Assessment:  Patient with left index finger mass about dorsal aspect of PIP joint.     Plan:  We discussed potential differential diagnoses including likely benign versus malignant lesions.  We discussed risks and benefits of continued observation versus attempted aspiration and surgical excision.  After thoroughly reviewing patient would like to proceed with surgical excision.  We did discuss potential need for additional intervention pending pathology review as well as possibility of recurrence/continued symptoms.  Expected operative and postoperative courses reviewed and questions addressed.

## 2024-02-20 NOTE — PROGRESS NOTES
History of Present Illness:  Chief Complaint   Patient presents with    Left Hand - Mass     Left index finger mass        Patient presents today for evaluation of left index finger mass that he first noticed about 4 to 5 months ago.  This mass is about the dorsal aspect of his index finger PIP joint.  He believes that burst did at some point, but recurred quickly after this happened.  He notes that it is somewhat bothersome and describes some itchiness overlying the area.  No specific pain.  No numbness or tingling.  No known injuries.    Past Medical History:   Diagnosis Date    Headache 10/05/2023    Hypercalcemia 10/05/2023    Hypocalcemia 10/05/2023    Parathyroid adenoma 10/05/2023    Personal history of (healed) traumatic fracture     History of fracture of ankle    Primary hyperparathyroidism (CMS/HCC) 05/10/2023    Sciatica of right side 10/05/2023       Medication Documentation Review Audit       Reviewed by Pina Palmer MA (Medical Assistant) on 02/19/24 at 0956      Medication Order Taking? Sig Documenting Provider Last Dose Status   celecoxib (CeleBREX) 200 mg capsule [id number]  Take 1 capsule (200 mg) by mouth 2 times a day as needed for moderate pain (4 - 6). Surya Kaur DO  Active   cholecalciferol (Vitamin D3) 10 MCG (400 UNIT) tablet [id number]  Take 1 tablet (10 mcg) by mouth every other day. Surya Kaur DO  Active   lisinopril 20 mg tablet [id number]  Take 1 tablet (20 mg) by mouth once daily. for blood pressure Surya Kaur DO  Active   melatonin 3 mg tablet [id number] No take one tablet nightly as needed for sleep Historical Provider, MD Taking Active   meloxicam (Mobic) 15 mg tablet [id number]  Take 1 tablet (15 mg) by mouth once daily. Jerry Leyva MD  Active   omeprazole (PriLOSEC) 40 mg DR capsule [id number]  TAKE 1 CAPSULE BY MOUTH ONCE DAILY AS NEEDED Surya Kaur DO  Active                    No Known Allergies    Social History     Socioeconomic History     Marital status: Single     Spouse name: Not on file    Number of children: Not on file    Years of education: Not on file    Highest education level: Not on file   Occupational History    Not on file   Tobacco Use    Smoking status: Every Day     Types: Cigarettes     Last attempt to quit: 2022     Years since quittin.5    Smokeless tobacco: Former   Vaping Use    Vaping Use: Former   Substance and Sexual Activity    Alcohol use: Yes     Comment: rarely    Drug use: Not Currently     Types: Marijuana    Sexual activity: Not on file   Other Topics Concern    Not on file   Social History Narrative    Not on file     Social Determinants of Health     Financial Resource Strain: Not on file   Food Insecurity: Not on file   Transportation Needs: Not on file   Physical Activity: Not on file   Stress: Not on file   Social Connections: Not on file   Intimate Partner Violence: Not on file   Housing Stability: Not on file       Past Surgical History:   Procedure Laterality Date    ANKLE ARTHROSCOPY W/ OPEN REPAIR  2017    Ankle Repair        Review of Systems   GENERAL: Negative for malaise, significant weight loss, fever  MUSCULOSKELETAL: see HPI  NEURO:  Negative     Physical Examination  Constitutional: Appears well-developed and well-nourished.  Head: Normocephalic and atraumatic.  Eyes: EOMI grossly  Cardiovascular: Intact distal pulses.   Respiratory: Effort normal. No respiratory distress.  Neurologic: Alert and oriented to person, place, and time.  Skin: Skin is warm and dry.  Hematologic / Lymphatic: No lymphedema, lymphangitis.  Psychiatric: normal mood and affect. Behavior is normal.   Musculoskeletal:  Left index finger: Mass about the dorsal aspect of the PIP joint most consistent with ganglion cyst.  This measures approximately 4 x 4 mm.  Slightly firm.  Skin is easily mobile over the mass.  Capillary refill less than 2 seconds distally.  FDS/FDP and terminal extensor intact.  Negative  Delaney.    Radiographs: Left index finger radiographs ordered and available for my review: No fracture/dislocation.  Joint spaces maintained.     Assessment:  Patient with left index finger mass about dorsal aspect of PIP joint.     Plan:  We discussed potential differential diagnoses including likely benign versus malignant lesions.  We discussed risks and benefits of continued observation versus attempted aspiration and surgical excision.  After thoroughly reviewing patient would like to proceed with surgical excision.  We did discuss potential need for additional intervention pending pathology review as well as possibility of recurrence/continued symptoms.  Expected operative and postoperative courses reviewed and questions addressed.

## 2024-03-05 NOTE — DISCHARGE INSTRUCTIONS
Dr. Torres Post-Operative Instructions  Hand/Wrist/Elbow    Activity:  Rest on the day of surgery.  Gradually resume your regular diet.    Anesthesia:  Numbing medication may last for 8-24 hours.    Post-Operative Medications:  You may resume your regular medications (including blood thinners).  You have been given a prescription for pain medication as needed.  You may also take over-the-counter anti-inflammatories and/or Tylenol for pain relief.  If you are taking the prescribed pain medication you must limit additional Tylenol (acetaminophen) intake to avoid overdose.    Dressings:  Keep your dressings clean and dry.  You may cover with a plastic bag or cast cover and seal bag to your skin above the bandage for showering.  If your dressing becomes wet or significantly bloodstained call the office at [phone].  Okay to remove outer dressings after 2-3 days and shower/wash hands.  Do not soak wound (I.e. no swimming pool, hot tub or dishes).    Post-Operative Care:  Keep the surgical site elevated above the level of your heart to limit swelling.  If your fingers are not included in the dressing you are encouraged to move your fingers regularly (full fist and full extension).  Regularly ice the surgical site.  You may also apply ice pack above the level of the dressing and this will cool the blood as it travels towards the surgical site.    Call Surgeon/Office at any time for:           Office Number: [phone]  Excessive bleeding  Loss of feeling (The local numbing medicine from surgery typically lasts 8-12 hours.  Nerve blocks can last 18-36 hours.)  Tight dressing: Make sure that you are elevating the operative site appropriately.  If no relief then call the office.                                                                                                        Circulation issues:  If fingers change to white or blue  Concerns/Problems with your surgery

## 2024-03-06 ENCOUNTER — APPOINTMENT (OUTPATIENT)
Dept: ORTHOPEDIC SURGERY | Facility: CLINIC | Age: 39
End: 2024-03-06
Payer: COMMERCIAL

## 2024-03-06 ENCOUNTER — HOSPITAL ENCOUNTER (OUTPATIENT)
Facility: HOSPITAL | Age: 39
Setting detail: OUTPATIENT SURGERY
Discharge: HOME | End: 2024-03-06
Attending: ORTHOPAEDIC SURGERY | Admitting: ORTHOPAEDIC SURGERY
Payer: COMMERCIAL

## 2024-03-06 VITALS
DIASTOLIC BLOOD PRESSURE: 92 MMHG | SYSTOLIC BLOOD PRESSURE: 128 MMHG | RESPIRATION RATE: 18 BRPM | HEART RATE: 70 BPM | WEIGHT: 217.59 LBS | HEIGHT: 65 IN | TEMPERATURE: 98.1 F | OXYGEN SATURATION: 98 % | BODY MASS INDEX: 36.25 KG/M2

## 2024-03-06 DIAGNOSIS — M67.40 GANGLION CYST: ICD-10-CM

## 2024-03-06 DIAGNOSIS — R22.32 FINGER MASS, LEFT: Primary | ICD-10-CM

## 2024-03-06 PROCEDURE — 7100000010 HC PHASE TWO TIME - EACH INCREMENTAL 1 MINUTE: Performed by: ORTHOPAEDIC SURGERY

## 2024-03-06 PROCEDURE — 7100000009 HC PHASE TWO TIME - INITIAL BASE CHARGE: Performed by: ORTHOPAEDIC SURGERY

## 2024-03-06 PROCEDURE — 2500000004 HC RX 250 GENERAL PHARMACY W/ HCPCS (ALT 636 FOR OP/ED): Performed by: ORTHOPAEDIC SURGERY

## 2024-03-06 PROCEDURE — 3600000008 HC OR TIME - EACH INCREMENTAL 1 MINUTE - PROCEDURE LEVEL THREE: Performed by: ORTHOPAEDIC SURGERY

## 2024-03-06 PROCEDURE — 3600000003 HC OR TIME - INITIAL BASE CHARGE - PROCEDURE LEVEL THREE: Performed by: ORTHOPAEDIC SURGERY

## 2024-03-06 PROCEDURE — 26160 REMOVE TENDON SHEATH LESION: CPT | Performed by: ORTHOPAEDIC SURGERY

## 2024-03-06 PROCEDURE — 2500000005 HC RX 250 GENERAL PHARMACY W/O HCPCS: Performed by: ORTHOPAEDIC SURGERY

## 2024-03-06 PROCEDURE — 88304 TISSUE EXAM BY PATHOLOGIST: CPT | Performed by: PATHOLOGY

## 2024-03-06 PROCEDURE — 88304 TISSUE EXAM BY PATHOLOGIST: CPT | Mod: TC,GEALAB | Performed by: ORTHOPAEDIC SURGERY

## 2024-03-06 RX ORDER — HYDROCODONE BITARTRATE AND ACETAMINOPHEN 5; 325 MG/1; MG/1
1 TABLET ORAL EVERY 6 HOURS PRN
Qty: 6 TABLET | Refills: 0 | Status: SHIPPED | OUTPATIENT
Start: 2024-03-06 | End: 2024-03-08

## 2024-03-06 RX ORDER — LIDOCAINE HYDROCHLORIDE 10 MG/ML
INJECTION INFILTRATION; PERINEURAL AS NEEDED
Status: DISCONTINUED | OUTPATIENT
Start: 2024-03-06 | End: 2024-03-06 | Stop reason: HOSPADM

## 2024-03-06 RX ORDER — BUPIVACAINE HYDROCHLORIDE 5 MG/ML
INJECTION, SOLUTION EPIDURAL; INTRACAUDAL AS NEEDED
Status: DISCONTINUED | OUTPATIENT
Start: 2024-03-06 | End: 2024-03-06 | Stop reason: HOSPADM

## 2024-03-06 NOTE — OP NOTE
Pre-Operative Diagnosis: Left index finger mass  Post-Operative Diagnosis: same  Procedure: Left index finger mass/ganglion excision  Surgeon: Melissa  Assistant: Eloise  Anesthesia: Local  Complications: none  Estimated blood loss: Minimal  Specimen: Left index finger mass/ganglion  Implants:  Nothing was implanted during the procedure  Findings: See below  Disposition: Good/PACU      Indications: Patient with symptomatic left index finger dorsal PIP joint mass.  We discussed risks and benefits of continued nonoperative versus operative treatment options.  After thoroughly reviewing patient wished to proceed with excision.  He understands potential for recurrence as well as continued symptoms.  Expected operative and postoperative courses reviewed and questions addressed.    Operative course: Patient was greeted in the preoperative holding area and the operative site was marked with indelible marker.  A mixture of half-and-half lidocaine and Marcaine with epinephrine was infiltrated about the planned incision site using sterile technique after confirming patient identifiers and surgical site.  Patient was brought back to the operating room suite where left upper extremity was prepped and draped in standard sterile fashion timeout procedure was performed as per standard protocol.  Digital turnicot was applied and longitudinal incision was made directly overlying the mass.  Gentle spreading was carried down through the subcutaneous tissues.  The dorsal vein was identified and protected with gentle blunt retraction.  The mass was also identified just distal to the insertion of the central slip and in between the 2 lateral bands.  The triangular ligament was identified and protected.  The ganglion cyst was then sharply excised along with a portion of its stalk and underlying capsule.  Integrity of the central slip and lateral bands was again confirmed.  Wound was then irrigated and reapproximated with 4-0 Monocryl in a  buried interrupted fashion followed by Exofin on the skin.  Dry sterile dressings were applied and patient was taken to the recovery for further care.  He will follow-up in 2 weeks for wound check and likely pathology review.

## 2024-03-12 LAB
LABORATORY COMMENT REPORT: NORMAL
PATH REPORT.FINAL DX SPEC: NORMAL
PATH REPORT.GROSS SPEC: NORMAL
PATH REPORT.RELEVANT HX SPEC: NORMAL
PATH REPORT.TOTAL CANCER: NORMAL
RESIDENT REVIEW: NORMAL

## 2024-03-18 ENCOUNTER — OFFICE VISIT (OUTPATIENT)
Dept: ORTHOPEDIC SURGERY | Facility: CLINIC | Age: 39
End: 2024-03-18
Payer: COMMERCIAL

## 2024-03-18 DIAGNOSIS — R22.32 FINGER MASS, LEFT: Primary | ICD-10-CM

## 2024-03-18 PROCEDURE — 99024 POSTOP FOLLOW-UP VISIT: CPT | Performed by: ORTHOPAEDIC SURGERY

## 2024-03-19 NOTE — PROGRESS NOTES
History of Present Illness  Chief Complaint   Patient presents with    Left Hand - Post-op     3/6/24 left index finger mass excision        Some residual stiffness, but otherwise doing well.    Examination  Left index finger  Incision is well healing. No signs of infection.  Sensation grossly intact distally.  Capillary refill less than 2 seconds.  0 cm DPC.  No extensor lag.     Assessment:  Patient status post left index finger ganglion excision.     Plan  Patient will begin scar tissue massage.  Continue mobilization and progression of activities.  We discussed expected recovery course as well as likely peak reaction at 4 to 6 weeks postoperatively.  We did discuss potential for mass recurrence.  Follow-up as needed.  Questions addressed.

## 2024-04-10 PROBLEM — J32.9 CHRONIC SINUSITIS: Status: ACTIVE | Noted: 2024-04-10

## 2024-04-10 PROBLEM — R05.9 COUGH: Status: RESOLVED | Noted: 2024-04-10 | Resolved: 2024-04-10

## 2024-04-10 PROBLEM — B35.3 TINEA PEDIS: Status: ACTIVE | Noted: 2024-04-10

## 2024-04-10 PROBLEM — R62.50 DEVELOPMENTAL DELAY: Status: ACTIVE | Noted: 2024-04-10

## 2024-04-10 PROBLEM — R03.0 FINDING OF ABOVE NORMAL BLOOD PRESSURE: Status: ACTIVE | Noted: 2024-04-10

## 2024-04-10 PROBLEM — R09.81 NASAL CONGESTION: Status: RESOLVED | Noted: 2024-04-10 | Resolved: 2024-04-10

## 2024-04-10 PROBLEM — L30.1 VESICULAR ECZEMA: Status: ACTIVE | Noted: 2024-04-10

## 2024-04-10 PROBLEM — R35.0 INCREASED FREQUENCY OF URINATION: Status: ACTIVE | Noted: 2024-04-10

## 2024-04-10 PROBLEM — R79.89 DECREASED TESTOSTERONE LEVEL: Status: ACTIVE | Noted: 2024-04-10

## 2024-04-10 RX ORDER — ERGOCALCIFEROL 1.25 MG/1
CAPSULE ORAL
COMMUNITY
Start: 2023-01-10 | End: 2024-05-20 | Stop reason: ALTCHOICE

## 2024-05-14 ENCOUNTER — TELEPHONE (OUTPATIENT)
Dept: PRIMARY CARE | Facility: CLINIC | Age: 39
End: 2024-05-14
Payer: COMMERCIAL

## 2024-05-14 DIAGNOSIS — M25.561 CHRONIC PAIN OF RIGHT KNEE: ICD-10-CM

## 2024-05-14 DIAGNOSIS — G89.29 CHRONIC PAIN OF RIGHT KNEE: ICD-10-CM

## 2024-05-14 RX ORDER — MELOXICAM 15 MG/1
15 TABLET ORAL DAILY
COMMUNITY
End: 2024-05-14 | Stop reason: SDUPTHER

## 2024-05-14 NOTE — TELEPHONE ENCOUNTER
PT HAS A FEW DAYS LEFT     Rx Refill Request Telephone Encounter    Name:  Manuel Humphreys  :  [id number]  Medication Name:    MELOXICAM 15MG Q/D 30 DAY   Specific Pharmacy location:  /  Date of last appointment:  2024  Date of next appointment:  2024  Best number to reach patient:  [phone]

## 2024-05-15 RX ORDER — MELOXICAM 15 MG/1
15 TABLET ORAL DAILY
Qty: 30 TABLET | Refills: 0 | Status: SHIPPED | OUTPATIENT
Start: 2024-05-15

## 2024-05-20 ENCOUNTER — OFFICE VISIT (OUTPATIENT)
Dept: PRIMARY CARE | Facility: CLINIC | Age: 39
End: 2024-05-20
Payer: COMMERCIAL

## 2024-05-20 ENCOUNTER — LAB (OUTPATIENT)
Dept: LAB | Facility: LAB | Age: 39
End: 2024-05-20
Payer: COMMERCIAL

## 2024-05-20 VITALS
BODY MASS INDEX: 35.82 KG/M2 | WEIGHT: 228.25 LBS | HEART RATE: 85 BPM | SYSTOLIC BLOOD PRESSURE: 138 MMHG | OXYGEN SATURATION: 96 % | HEIGHT: 67 IN | DIASTOLIC BLOOD PRESSURE: 90 MMHG

## 2024-05-20 DIAGNOSIS — M25.561 CHRONIC PAIN OF RIGHT KNEE: ICD-10-CM

## 2024-05-20 DIAGNOSIS — M19.90 GENERALIZED ARTHRITIS: Primary | ICD-10-CM

## 2024-05-20 DIAGNOSIS — R74.8 ELEVATED LIVER ENZYMES: ICD-10-CM

## 2024-05-20 DIAGNOSIS — E83.52 HYPERCALCEMIA: ICD-10-CM

## 2024-05-20 DIAGNOSIS — I10 ESSENTIAL HYPERTENSION: ICD-10-CM

## 2024-05-20 DIAGNOSIS — G89.29 CHRONIC PAIN OF RIGHT KNEE: ICD-10-CM

## 2024-05-20 LAB
25(OH)D3 SERPL-MCNC: 37 NG/ML (ref 30–100)
ALBUMIN SERPL BCP-MCNC: 4.6 G/DL (ref 3.4–5)
ALP SERPL-CCNC: 92 U/L (ref 33–120)
ALT SERPL W P-5'-P-CCNC: 67 U/L (ref 10–52)
ANION GAP SERPL CALC-SCNC: 12 MMOL/L (ref 10–20)
AST SERPL W P-5'-P-CCNC: 33 U/L (ref 9–39)
BILIRUB DIRECT SERPL-MCNC: 0.1 MG/DL (ref 0–0.3)
BILIRUB SERPL-MCNC: 0.7 MG/DL (ref 0–1.2)
BUN SERPL-MCNC: 17 MG/DL (ref 6–23)
CA-I BLD-SCNC: 1.33 MMOL/L (ref 1.1–1.33)
CALCIUM SERPL-MCNC: 10.4 MG/DL (ref 8.6–10.3)
CHLORIDE SERPL-SCNC: 102 MMOL/L (ref 98–107)
CO2 SERPL-SCNC: 30 MMOL/L (ref 21–32)
CREAT SERPL-MCNC: 1 MG/DL (ref 0.5–1.3)
EGFRCR SERPLBLD CKD-EPI 2021: >90 ML/MIN/1.73M*2
FERRITIN SERPL-MCNC: 85 NG/ML (ref 20–300)
GGT SERPL-CCNC: 57 U/L (ref 5–64)
GLUCOSE SERPL-MCNC: 101 MG/DL (ref 74–99)
HCV AB SER QL: NONREACTIVE
IGG SERPL-MCNC: 920 MG/DL (ref 700–1600)
INR PPP: 0.9 (ref 0.9–1.1)
IRON SATN MFR SERPL: 31 % (ref 25–45)
IRON SERPL-MCNC: 117 UG/DL (ref 35–150)
POTASSIUM SERPL-SCNC: 4.6 MMOL/L (ref 3.5–5.3)
PROT SERPL-MCNC: 7.1 G/DL (ref 6.4–8.2)
PROTHROMBIN TIME: 9.9 SECONDS (ref 9.8–12.8)
PTH-INTACT SERPL-MCNC: 122.8 PG/ML (ref 18.5–88)
SODIUM SERPL-SCNC: 139 MMOL/L (ref 136–145)
TIBC SERPL-MCNC: 378 UG/DL (ref 240–445)
UIBC SERPL-MCNC: 261 UG/DL (ref 110–370)

## 2024-05-20 PROCEDURE — 86256 FLUORESCENT ANTIBODY TITER: CPT

## 2024-05-20 PROCEDURE — 83970 ASSAY OF PARATHORMONE: CPT

## 2024-05-20 PROCEDURE — 83540 ASSAY OF IRON: CPT

## 2024-05-20 PROCEDURE — 82652 VIT D 1 25-DIHYDROXY: CPT

## 2024-05-20 PROCEDURE — 82784 ASSAY IGA/IGD/IGG/IGM EACH: CPT

## 2024-05-20 PROCEDURE — 99214 OFFICE O/P EST MOD 30 MIN: CPT | Performed by: FAMILY MEDICINE

## 2024-05-20 PROCEDURE — 86803 HEPATITIS C AB TEST: CPT

## 2024-05-20 PROCEDURE — 3080F DIAST BP >= 90 MM HG: CPT | Performed by: FAMILY MEDICINE

## 2024-05-20 PROCEDURE — 36415 COLL VENOUS BLD VENIPUNCTURE: CPT

## 2024-05-20 PROCEDURE — 3075F SYST BP GE 130 - 139MM HG: CPT | Performed by: FAMILY MEDICINE

## 2024-05-20 PROCEDURE — 82977 ASSAY OF GGT: CPT

## 2024-05-20 PROCEDURE — 82728 ASSAY OF FERRITIN: CPT

## 2024-05-20 PROCEDURE — 80053 COMPREHEN METABOLIC PANEL: CPT

## 2024-05-20 PROCEDURE — 86381 MITOCHONDRIAL ANTIBODY EACH: CPT

## 2024-05-20 PROCEDURE — 82248 BILIRUBIN DIRECT: CPT

## 2024-05-20 PROCEDURE — 85610 PROTHROMBIN TIME: CPT

## 2024-05-20 PROCEDURE — 86376 MICROSOMAL ANTIBODY EACH: CPT

## 2024-05-20 PROCEDURE — 83550 IRON BINDING TEST: CPT

## 2024-05-20 PROCEDURE — 82306 VITAMIN D 25 HYDROXY: CPT

## 2024-05-20 PROCEDURE — 86015 ACTIN ANTIBODY EACH: CPT

## 2024-05-20 PROCEDURE — 82330 ASSAY OF CALCIUM: CPT

## 2024-05-20 ASSESSMENT — ENCOUNTER SYMPTOMS: ARTHRALGIAS: 1

## 2024-05-20 NOTE — PROGRESS NOTES
"Subjective   Patient ID: Manuel Humphreys is a 38 y.o. male who presents for Follow-up (Pt presents for check up/med review, no concerns, no refills needed per pt. BL).  HPI Historian(s): Self    Generally feeling well.    Not checking BP at home.    Rarely consumes alcohol.    Review of Systems   Musculoskeletal:  Positive for arthralgias.   All other systems reviewed and are negative.        Objective   /90   Pulse 85   Ht 1.695 m (5' 6.75\")   Wt 104 kg (228 lb 4 oz)   SpO2 96%   BMI 36.02 kg/m²     Physical Exam  Vitals and nursing note reviewed.   Constitutional:       General: He is not in acute distress.     Appearance: Normal appearance.      Comments: No assistive device presently being used.   HENT:      Head: Normocephalic and atraumatic.   Eyes:      General: No scleral icterus.     Extraocular Movements: Extraocular movements intact.      Conjunctiva/sclera: Conjunctivae normal.   Pulmonary:      Effort: Pulmonary effort is normal. No respiratory distress.   Abdominal:      General: Bowel sounds are normal. There is no distension.      Palpations: Abdomen is soft. There is no hepatomegaly, splenomegaly or mass.      Tenderness: There is no abdominal tenderness. There is no guarding or rebound.   Skin:     General: Skin is warm and dry.      Coloration: Skin is not jaundiced.   Neurological:      Mental Status: He is alert and oriented to person, place, and time. Mental status is at baseline.   Psychiatric:         Behavior: Behavior normal.         Assessment/Plan   Problem List Items Addressed This Visit       Essential hypertension    Current Assessment & Plan     Well controlled.          Generalized arthritis - Primary    Hypercalcemia    Current Assessment & Plan     Recheck, check PTH.         Relevant Orders    Calcium, Ionized    Parathyroid Hormone, Intact    Vitamin D 1,25 Dihydroxy (for eval of hypercalcemia)    Vitamin D 25-Hydroxy,Total (for eval of Vitamin D levels)    Follow Up " In Primary Care - Established    Elevated liver enzymes    Current Assessment & Plan     Cautioned regarding liver, GI, multiple NSAIDs, and pointed out other highlights.         Relevant Orders    US abdomen limited liver    Hepatic Function Panel    Gamma-Glutamyl Transferase    Protime-INR    Hepatitis C Antibody    Iron and TIBC    Ferritin    Liver/Kidney Microsome Type 1 Antibodies, Serum    Anti-Smooth Muscle Antibody    Anti-Mitochondrial Antibody    IgG    Follow Up In Primary Care - Established    Chronic pain of right knee    Current Assessment & Plan     Wishes to continue Meloxicam.

## 2024-05-20 NOTE — PATIENT INSTRUCTIONS
Monitor your resting blood pressure (BP) and heart rate (HR) at home. Resting BP should be fairly consistently better than 140/90, preferably better than 130/80. Please bring your blood pressure cuff to your appointments.  For a list of validated BP cuffs: https://www.validatebp.org/    Highlights of Adverse Effects Associated with NSAIDs (e.g., ibuprofen, naproxen, celecoxib, diclofenac, meloxicam, et al.)  (Non-Steroidal Anti-Inflammatory Drugs)  Heart attack, heart failure, stroke, increased blood pressure, kidney injury or failure, liver injury or failure, gastritis, gastrointestinal bleeding, other bleeding, effects on blood counts, lung disease. VAUGHAN-2 selective inhibitors (Celebrex/celecoxib) are less likely to (may still) cause gastrointestinal injury, but may carry higher risks for heart attack and stroke. People with known coronary artery disease (e.g., history of heart attack or stroke, angina, or narrowed arteries in the heart or brain) and people who are at a higher than average risk for these conditions should avoid using VAUGHAN-2 inhibitors in particular. NSAIDs are generally not recommended for people with kidney disease, heart failure, heart disease, or cirrhosis, or for people who take diuretics. Chronic use tends to increase risk. Miscarriage, congenital malformation, low amniotic fluid, and/or serious kidney dysfunction for baby if taken during pregnancy.    Recommend a low-sugar, low-simple-carbohydrate, low-fat, heart-healthy diet, weight, and lifestyle, and avoiding alcohol, NSAIDs, and Acetaminophen.     Please return for a(n) liver, calcium, medication follow-up appointment in 3 months, and after tests to review results and options, earlier if any question or concern.    Avoid taking Biotin for a week prior to any blood tests, as it can interfere with certain results. Fasting for labs means 12 hours, nothing to eat or drink, except water and medications, unless directed otherwise.    For  assistance with scheduling referrals or consultations, please call [phone]. For laboratory, radiology, and other tests, please call [phone] ([phone] for pediatrics). Please review prescription inserts and published information for possible adverse effects of all medications. Return after testing or consultation to review results and recommendations, if symptoms persist, change, worsen, or return, or if you have any question or concern. If you do not get results within 7-10 days, or you have any question or concern, please send a message, call the office ([phone]), or return to the office for a follow-up appointment. For non-emergencies, you may call the office. For emergencies, call 9-1-1 or go to the nearest Emergency Department. Please schedule additional appointment(s) to address concern(s) not addressed today.    In general, results are not released or discussed over the telephone, but at an appointment or via  [employer]. Results of tests done through [facility] are released via  [employer] (see below).  [email or web address]   [employer] support line: [phone]

## 2024-05-21 DIAGNOSIS — E21.0 PRIMARY HYPERPARATHYROIDISM (MULTI): Primary | ICD-10-CM

## 2024-05-21 DIAGNOSIS — Z13.820 SCREENING FOR OSTEOPOROSIS: ICD-10-CM

## 2024-05-22 ENCOUNTER — APPOINTMENT (OUTPATIENT)
Dept: RADIOLOGY | Facility: HOSPITAL | Age: 39
End: 2024-05-22
Payer: COMMERCIAL

## 2024-05-22 LAB — 1,25(OH)2D3 SERPL-MCNC: 93.5 PG/ML (ref 19.9–79.3)

## 2024-05-23 ENCOUNTER — TELEPHONE (OUTPATIENT)
Dept: PRIMARY CARE | Facility: CLINIC | Age: 39
End: 2024-05-23
Payer: COMMERCIAL

## 2024-05-23 LAB — LKM AB TITR SER IF: NORMAL {TITER}

## 2024-05-23 NOTE — TELEPHONE ENCOUNTER
Result Communication    Resulted Orders   Comprehensive Metabolic Panel   Result Value Ref Range    Glucose 101 (H) 74 - 99 mg/dL    Sodium 139 136 - 145 mmol/L    Potassium 4.6 3.5 - 5.3 mmol/L    Chloride 102 98 - 107 mmol/L    Bicarbonate 30 21 - 32 mmol/L    Anion Gap 12 10 - 20 mmol/L    Urea Nitrogen 17 6 - 23 mg/dL    Creatinine 1.00 0.50 - 1.30 mg/dL    eGFR >90 >60 mL/min/1.73m*2      Comment:      Calculations of estimated GFR are performed using the 2021 CKD-EPI Study Refit equation without the race variable for the IDMS-Traceable creatinine methods.  https://jasn.asnjournals.org/content/early/2021/09/22/ASN.8172235700    Calcium 10.4 (H) 8.6 - 10.3 mg/dL    Albumin 4.6 3.4 - 5.0 g/dL    Alkaline Phosphatase 92 33 - 120 U/L    Total Protein 7.1 6.4 - 8.2 g/dL    AST 33 9 - 39 U/L    Bilirubin, Total 0.7 0.0 - 1.2 mg/dL    ALT 67 (H) 10 - 52 U/L      Comment:      Patients treated with Sulfasalazine may generate falsely decreased results for ALT.   Calcium, ionized   Result Value Ref Range    POCT Calcium, Ionized 1.33 1.1 - 1.33 mmol/L      Comment:      The performance characteristics of ionized calcium tested  in heparinized plasma or serum have been validated by the  individual  laboratory site where testing is performed.   Testing on heparinized plasma or serum is not approved by   the FDA; however, such approval is not necessary.   Parathyroid Hormone, Intact   Result Value Ref Range    Parathyroid Hormone, Intact 122.8 (H) 18.5 - 88.0 pg/mL   Vitamin D 25-Hydroxy,Total (for eval of Vitamin D levels)   Result Value Ref Range    Vitamin D, 25-Hydroxy, Total 37 30 - 100 ng/mL    Narrative    Deficiency:         < 20   ng/ml  Insufficiency:      20-29  ng/ml  Sufficiency:         ng/ml  This assay accurately quantifies the sum of Vitamin D3, 25-Hydroxy and Vitamin D2,25-Hydroxy.   Gamma-Glutamyl Transferase   Result Value Ref Range    GGT 57 5 - 64 U/L   Protime-INR   Result Value Ref Range     Protime 9.9 9.8 - 12.8 seconds    INR 0.9 0.9 - 1.1   Hepatitis C Antibody   Result Value Ref Range    Hepatitis C AB Nonreactive Nonreactive      Comment:      Results from patients taking biotin supplements or receiving high-dose biotin therapy should be interpreted with caution due to possible interference with this test. Providers may contact their local laboratory for further information.   Iron and TIBC   Result Value Ref Range    Iron 117 35 - 150 ug/dL    UIBC 261 110 - 370 ug/dL    TIBC 378 240 - 445 ug/dL    % Saturation 31 25 - 45 %   Ferritin   Result Value Ref Range    Ferritin 85 20 - 300 ng/mL   IgG   Result Value Ref Range    IgG 920 700 - 1,600 mg/dL      Comment:      MONOCLONAL PROTEINS MAY CAUSE FALSELY LOW  RESULTS IN THIS ASSAY. SERUM PROTEIN  ELECTROPHORESIS SHOULD BE DONE AS THE  FIRST TEST TO EVALUATE MONOCLONAL GAMMOPATHY.     Vitamin D 1,25 Dihydroxy (for eval of hypercalcemia)   Result Value Ref Range    Vit D, 1,25-Dihydroxy 93.5 (H) 19.9 - 79.3 pg/mL      Comment:      INTERPRETIVE INFORMATION: Vitamin D, 1,25-Dihydroxy    This test is primarily indicated during patient evaluation for   hypercalcemia and renal failure. A normal result does not rule out   Vitamin D deficiency. The recommended test for diagnosing Vitamin   D deficiency is Vitamin D 25-hydroxy.  Performed By: [facility]  [address] [place] [id number]  : Wenceslao Ervin MD, PhD  CLIA Number: [id number]   Bilirubin, Direct   Result Value Ref Range    Bilirubin, Direct 0.1 0.0 - 0.3 mg/dL       10:19 AM      Results were successfully communicated with the patient and they acknowledged their understanding.

## 2024-05-23 NOTE — TELEPHONE ENCOUNTER
Result Communication    Resulted Orders   Comprehensive Metabolic Panel   Result Value Ref Range    Glucose 101 (H) 74 - 99 mg/dL    Sodium 139 136 - 145 mmol/L    Potassium 4.6 3.5 - 5.3 mmol/L    Chloride 102 98 - 107 mmol/L    Bicarbonate 30 21 - 32 mmol/L    Anion Gap 12 10 - 20 mmol/L    Urea Nitrogen 17 6 - 23 mg/dL    Creatinine 1.00 0.50 - 1.30 mg/dL    eGFR >90 >60 mL/min/1.73m*2      Comment:      Calculations of estimated GFR are performed using the 2021 CKD-EPI Study Refit equation without the race variable for the IDMS-Traceable creatinine methods.  https://jasn.asnjournals.org/content/early/2021/09/22/ASN.1146507531    Calcium 10.4 (H) 8.6 - 10.3 mg/dL    Albumin 4.6 3.4 - 5.0 g/dL    Alkaline Phosphatase 92 33 - 120 U/L    Total Protein 7.1 6.4 - 8.2 g/dL    AST 33 9 - 39 U/L    Bilirubin, Total 0.7 0.0 - 1.2 mg/dL    ALT 67 (H) 10 - 52 U/L      Comment:      Patients treated with Sulfasalazine may generate falsely decreased results for ALT.   Calcium, ionized   Result Value Ref Range    POCT Calcium, Ionized 1.33 1.1 - 1.33 mmol/L      Comment:      The performance characteristics of ionized calcium tested  in heparinized plasma or serum have been validated by the  individual  laboratory site where testing is performed.   Testing on heparinized plasma or serum is not approved by   the FDA; however, such approval is not necessary.   Parathyroid Hormone, Intact   Result Value Ref Range    Parathyroid Hormone, Intact 122.8 (H) 18.5 - 88.0 pg/mL   Vitamin D 25-Hydroxy,Total (for eval of Vitamin D levels)   Result Value Ref Range    Vitamin D, 25-Hydroxy, Total 37 30 - 100 ng/mL    Narrative    Deficiency:         < 20   ng/ml  Insufficiency:      20-29  ng/ml  Sufficiency:         ng/ml  This assay accurately quantifies the sum of Vitamin D3, 25-Hydroxy and Vitamin D2,25-Hydroxy.   Gamma-Glutamyl Transferase   Result Value Ref Range    GGT 57 5 - 64 U/L   Protime-INR   Result Value Ref Range     Protime 9.9 9.8 - 12.8 seconds    INR 0.9 0.9 - 1.1   Hepatitis C Antibody   Result Value Ref Range    Hepatitis C AB Nonreactive Nonreactive      Comment:      Results from patients taking biotin supplements or receiving high-dose biotin therapy should be interpreted with caution due to possible interference with this test. Providers may contact their local laboratory for further information.   Iron and TIBC   Result Value Ref Range    Iron 117 35 - 150 ug/dL    UIBC 261 110 - 370 ug/dL    TIBC 378 240 - 445 ug/dL    % Saturation 31 25 - 45 %   Ferritin   Result Value Ref Range    Ferritin 85 20 - 300 ng/mL   IgG   Result Value Ref Range    IgG 920 700 - 1,600 mg/dL      Comment:      MONOCLONAL PROTEINS MAY CAUSE FALSELY LOW  RESULTS IN THIS ASSAY. SERUM PROTEIN  ELECTROPHORESIS SHOULD BE DONE AS THE  FIRST TEST TO EVALUATE MONOCLONAL GAMMOPATHY.     Bilirubin, Direct   Result Value Ref Range    Bilirubin, Direct 0.1 0.0 - 0.3 mg/dL       9:40 AM      Results were successfully communicated with the patient and they acknowledged their understanding.

## 2024-05-23 NOTE — TELEPHONE ENCOUNTER
----- Message from Surya Kaur DO sent at 5/22/2024  6:01 PM EDT -----  Please let patient know that his vitamin D level is too high.  Recommend reducing vitamin D supplementation by stopping the weekly high-dose.  Could restart vitamin D supplementation, but at daily low-dose of 1000 units over-the-counter  vitamin D3.

## 2024-05-23 NOTE — TELEPHONE ENCOUNTER
----- Message from Surya Kaur DO sent at 5/21/2024 12:04 PM EDT -----  Please let patient know that his intact PTH is high, but his ionized calcium is now in the normal range at the upper limit of normal. Appears to be primary hyperparathyroidism. Recommend rechecking his calcium and kidney numbers periodically, as well as a bone density test. Order entered for DEXA. He should avoid excessive intake of calcium or vitamin D, and should exercise regularly. He should seek medical attention if symptoms of high calcium, e.g., bone pain, fatigue and confusion, weakness or muscle twitches, increased thirst or change in urine output, unexplained nausea, vomiting, poor appetite.

## 2024-05-27 LAB
MITOCHONDRIA AB SER QL IF: NEGATIVE
SMOOTH MUSCLE AB SER QL IF: ABNORMAL

## 2024-05-28 ENCOUNTER — TELEPHONE (OUTPATIENT)
Dept: PRIMARY CARE | Facility: CLINIC | Age: 39
End: 2024-05-28
Payer: COMMERCIAL

## 2024-05-28 NOTE — TELEPHONE ENCOUNTER
----- Message from Surya Kaur DO sent at 5/28/2024  9:17 AM EDT -----  Please let patient know that his anti-smooth muscle antibody is slightly positive.  This likely does not indicate autoimmune hepatitis, but recommend follow-up with a gastroenterologist or hepatologist.    Gastroenterology  Dr. Maribell Holder, Alexandra Burnett CNP ([place]) [phone]  Dr. Deloris Caruso, Dr. Katherine Hsu, Dr. Olivier Quinonez, Dr. Clyde Barbosa, Dr. Edward Montoya ([place]) [phone]    Hepatology  Dr. Bonilla Jimenez ([place]) [phone]  Dr. Víctor Lindsey, Dr. Junior Mathew, Dr. Bryce Boateng, Dr. Kushal Fowler ([place]) [phone]

## 2024-06-05 ENCOUNTER — HOSPITAL ENCOUNTER (OUTPATIENT)
Dept: RADIOLOGY | Facility: HOSPITAL | Age: 39
Discharge: HOME | End: 2024-06-05
Payer: COMMERCIAL

## 2024-06-05 DIAGNOSIS — R74.8 ELEVATED LIVER ENZYMES: ICD-10-CM

## 2024-06-05 DIAGNOSIS — Z13.820 SCREENING FOR OSTEOPOROSIS: ICD-10-CM

## 2024-06-05 PROCEDURE — 77080 DXA BONE DENSITY AXIAL: CPT

## 2024-06-05 PROCEDURE — 76705 ECHO EXAM OF ABDOMEN: CPT

## 2024-06-05 PROCEDURE — 76705 ECHO EXAM OF ABDOMEN: CPT | Performed by: RADIOLOGY

## 2024-06-05 PROCEDURE — 77080 DXA BONE DENSITY AXIAL: CPT | Performed by: RADIOLOGY

## 2024-06-06 ENCOUNTER — TELEPHONE (OUTPATIENT)
Dept: PRIMARY CARE | Facility: CLINIC | Age: 39
End: 2024-06-06
Payer: COMMERCIAL

## 2024-06-06 NOTE — TELEPHONE ENCOUNTER
Result Communication    Resulted Orders   XR DEXA bone density    Narrative    Interpreted By:  Russell Zavala,   STUDY:  DEXA BONE DENSITY6/5/2024 10:58 am      INDICATION:  Signs/Symptoms:hypercalcemia, primary hyperparathyroid.  The patient  is a 38 y/o  year old M.      COMPARISON:  None.      ACCESSION NUMBER(S):  [id number]      ORDERING CLINICIAN:  WES DUNCAN      TECHNIQUE:  DEXA BONE DENSITY      FINDINGS:  SPINE L1-L4  Bone Mineral Density: 0.925  T-Score -1.5  Z-Score -1.4  Category: Osteopenia  Bone Mineral Density change vs baseline: Not reported  Bone Mineral Density change vs previous: Not reported      LEFT FEMUR -TOTAL  Bone Mineral Density: 1.019  T-Score -0.1   Z-Score  0.1  Category: Normal  Bone Mineral Density change vs baseline: Not reported  Bone Mineral Density change vs previous: Not reported      LEFT FEMUR -NECK  Bone Mineral Density: 0.784  T-Score -1.1  Z-Score -0.6  Category: Osteopenia  Bone Mineral Density change vs baseline: Not reported  Bone Mineral Density change vs previous: Not reported      World Health Organization (WHO) criteria for post-menopausal,   Women:  Normal:         T-score at or above -1 SD  Osteopenia:   T-score between -1 and -2.5 SD  Osteoporosis: T-score at or below -2.5 SD          10-year Fracture Risk:  Major Osteoporotic Fracture  Not reported  Hip Fracture                        Not reported      Note:  If no FRAX score is reported, it is because:  Some T-score for Spine Total or Hip Total or Femoral Neck at or below  -2.5      The left femoral neck and total L1-4 T-scores of -1.1 and -1.5  respectively are osteopenic (low bone mass), placing the patient at  increased fracture risk and at risk of developing madiha osteoporosis.  The left total hip T-score of -0.1 is normal.        Impression    DEXA:  Left femoral neck and lumbar spine osteopenia. Normal left  total hip bone mineral density.      All images and detailed analysis are  available on the  Radiology  PACS.      MACRO:  None      Signed by: Russell Zavala 6/5/2024 11:03 AM  Dictation workstation:   [id number]       10:41 AM      Results were successfully communicated with the patient and they acknowledged their understanding.

## 2024-06-06 NOTE — TELEPHONE ENCOUNTER
----- Message from Surya Kaur DO sent at 6/5/2024  2:19 PM EDT -----  Please let patient know that his    DEXA report indicates osteopenia.  Osteopenia is mild to moderate thinning of the bones. Recommend adequate calcium (800-1200 mg daily from all sources) and vitamin D (400-1000 units daily) intake, exercise, smoking cessation, fall prevention, and avoidance of heavy alcohol use.  Especially if there is a history of osteoporotic/fragility fracture (hip, spine compression fracture, etc.), could consider a medication to increase bone density after a dental exam with a dentist.  Recommend a follow-up appointment to review results and options, if any question or concern.

## 2024-06-10 ENCOUNTER — TELEPHONE (OUTPATIENT)
Dept: PRIMARY CARE | Facility: CLINIC | Age: 39
End: 2024-06-10
Payer: COMMERCIAL

## 2024-06-13 ENCOUNTER — TELEPHONE (OUTPATIENT)
Dept: PRIMARY CARE | Facility: CLINIC | Age: 39
End: 2024-06-13
Payer: COMMERCIAL

## 2024-06-13 NOTE — TELEPHONE ENCOUNTER
Pt has an therapy cat for his anxiety and depression and his therapist wrote a letter but his landlord wants it to come from his PCP can you write a note

## 2024-08-03 DIAGNOSIS — G89.29 CHRONIC PAIN OF RIGHT KNEE: ICD-10-CM

## 2024-08-03 DIAGNOSIS — M25.561 CHRONIC PAIN OF RIGHT KNEE: ICD-10-CM

## 2024-08-05 ENCOUNTER — TELEPHONE (OUTPATIENT)
Dept: PRIMARY CARE | Facility: CLINIC | Age: 39
End: 2024-08-05
Payer: COMMERCIAL

## 2024-08-05 DIAGNOSIS — K21.9 GASTROESOPHAGEAL REFLUX DISEASE WITHOUT ESOPHAGITIS: ICD-10-CM

## 2024-08-05 NOTE — TELEPHONE ENCOUNTER
Rx Refill Request Telephone Encounter    Name:  Manuel Humphreys  :  [id number]  Medication Name:    OMEPRAZOLE 40MG Q/D   Specific Pharmacy location:  /  Date of last appointment:  2024  Date of next appointment:  09/10/2024  Best number to reach patient:  [phone]

## 2024-08-06 RX ORDER — OMEPRAZOLE 40 MG/1
CAPSULE, DELAYED RELEASE ORAL
Qty: 90 CAPSULE | Refills: 0 | Status: SHIPPED | OUTPATIENT
Start: 2024-08-06

## 2024-08-06 RX ORDER — MELOXICAM 15 MG/1
15 TABLET ORAL DAILY
Qty: 30 TABLET | Refills: 1 | Status: SHIPPED | OUTPATIENT
Start: 2024-08-06

## 2024-09-10 ENCOUNTER — APPOINTMENT (OUTPATIENT)
Dept: PRIMARY CARE | Facility: CLINIC | Age: 39
End: 2024-09-10
Payer: COMMERCIAL

## 2024-09-28 DIAGNOSIS — G89.29 CHRONIC PAIN OF RIGHT KNEE: ICD-10-CM

## 2024-09-28 DIAGNOSIS — M25.561 CHRONIC PAIN OF RIGHT KNEE: ICD-10-CM

## 2024-10-16 RX ORDER — MELOXICAM 15 MG/1
15 TABLET ORAL DAILY
Qty: 30 TABLET | Refills: 0 | Status: SHIPPED | OUTPATIENT
Start: 2024-10-16

## 2024-11-02 DIAGNOSIS — K21.9 GASTROESOPHAGEAL REFLUX DISEASE WITHOUT ESOPHAGITIS: ICD-10-CM

## 2024-11-05 RX ORDER — OMEPRAZOLE 40 MG/1
CAPSULE, DELAYED RELEASE ORAL
Qty: 90 CAPSULE | Refills: 0 | Status: SHIPPED | OUTPATIENT
Start: 2024-11-05

## 2024-11-07 DIAGNOSIS — M25.561 CHRONIC PAIN OF RIGHT KNEE: ICD-10-CM

## 2024-11-07 DIAGNOSIS — G89.29 CHRONIC PAIN OF RIGHT KNEE: ICD-10-CM

## 2024-11-08 RX ORDER — MELOXICAM 15 MG/1
15 TABLET ORAL DAILY
Qty: 30 TABLET | Refills: 2 | Status: SHIPPED | OUTPATIENT
Start: 2024-11-08

## 2024-12-28 DIAGNOSIS — I10 ESSENTIAL HYPERTENSION: ICD-10-CM

## 2024-12-28 DIAGNOSIS — K21.9 GASTROESOPHAGEAL REFLUX DISEASE WITHOUT ESOPHAGITIS: ICD-10-CM

## 2024-12-30 RX ORDER — OMEPRAZOLE 40 MG/1
CAPSULE, DELAYED RELEASE ORAL
Qty: 90 CAPSULE | Refills: 0 | Status: SHIPPED | OUTPATIENT
Start: 2024-12-30

## 2024-12-30 RX ORDER — LISINOPRIL 20 MG/1
20 TABLET ORAL DAILY
Qty: 90 TABLET | Refills: 0 | Status: SHIPPED | OUTPATIENT
Start: 2024-12-30

## 2025-02-11 ENCOUNTER — APPOINTMENT (OUTPATIENT)
Dept: PRIMARY CARE | Facility: CLINIC | Age: 40
End: 2025-02-11
Payer: COMMERCIAL

## 2025-02-11 PROBLEM — K76.0 HEPATIC STEATOSIS: Status: ACTIVE | Noted: 2025-02-11

## 2025-04-06 DIAGNOSIS — I10 ESSENTIAL HYPERTENSION: ICD-10-CM

## 2025-04-06 DIAGNOSIS — K21.9 GASTROESOPHAGEAL REFLUX DISEASE WITHOUT ESOPHAGITIS: ICD-10-CM

## 2025-05-14 ENCOUNTER — TELEPHONE (OUTPATIENT)
Dept: PRIMARY CARE | Facility: CLINIC | Age: 40
End: 2025-05-14

## 2025-05-14 DIAGNOSIS — K21.9 GASTROESOPHAGEAL REFLUX DISEASE WITHOUT ESOPHAGITIS: ICD-10-CM

## 2025-05-14 DIAGNOSIS — I10 ESSENTIAL HYPERTENSION: ICD-10-CM

## 2025-05-14 RX ORDER — OMEPRAZOLE 40 MG/1
CAPSULE, DELAYED RELEASE ORAL
Qty: 90 CAPSULE | Refills: 0 | OUTPATIENT
Start: 2025-05-14

## 2025-05-14 RX ORDER — LISINOPRIL 20 MG/1
20 TABLET ORAL DAILY
Qty: 90 TABLET | Refills: 0 | OUTPATIENT
Start: 2025-05-14

## 2025-05-14 NOTE — TELEPHONE ENCOUNTER
Pt calling for refills he was told he had to make an appt and keep it for refills. Please only send in enough to get him to the appt. He has been told he will not get any more refills if he no shows this appt.     NOV 6/6/2025    Omeprazole 40mg  1 po every day    Lisinopril 20mg  1 po every day      Disp 23 days    WM Cates

## 2025-05-15 RX ORDER — LISINOPRIL 20 MG/1
20 TABLET ORAL DAILY
Qty: 23 TABLET | Refills: 0 | Status: SHIPPED | OUTPATIENT
Start: 2025-05-15

## 2025-05-15 RX ORDER — OMEPRAZOLE 40 MG/1
CAPSULE, DELAYED RELEASE ORAL
Qty: 23 CAPSULE | Refills: 0 | Status: SHIPPED | OUTPATIENT
Start: 2025-05-15

## 2025-05-23 ENCOUNTER — TELEPHONE (OUTPATIENT)
Dept: PRIMARY CARE | Facility: CLINIC | Age: 40
End: 2025-05-23

## 2025-05-23 NOTE — TELEPHONE ENCOUNTER
called to see what medications the PT is taking as well as if there is a possibility of those medications be generic due to insurance reasons.    She would appreciate a call back at  [phone]

## 2025-06-06 ENCOUNTER — APPOINTMENT (OUTPATIENT)
Dept: PRIMARY CARE | Facility: CLINIC | Age: 40
End: 2025-06-06
Payer: MEDICARE

## 2025-06-06 VITALS
DIASTOLIC BLOOD PRESSURE: 96 MMHG | BODY MASS INDEX: 32.51 KG/M2 | WEIGHT: 206 LBS | OXYGEN SATURATION: 97 % | SYSTOLIC BLOOD PRESSURE: 137 MMHG | HEART RATE: 73 BPM

## 2025-06-06 DIAGNOSIS — M25.561 CHRONIC PAIN OF RIGHT KNEE: ICD-10-CM

## 2025-06-06 DIAGNOSIS — E83.52 HYPERCALCEMIA: ICD-10-CM

## 2025-06-06 DIAGNOSIS — E21.0 PRIMARY HYPERPARATHYROIDISM (MULTI): ICD-10-CM

## 2025-06-06 DIAGNOSIS — K21.9 GASTROESOPHAGEAL REFLUX DISEASE WITHOUT ESOPHAGITIS: ICD-10-CM

## 2025-06-06 DIAGNOSIS — Z00.00 WELL ADULT HEALTH CHECK: Primary | ICD-10-CM

## 2025-06-06 DIAGNOSIS — G89.29 CHRONIC PAIN OF RIGHT KNEE: ICD-10-CM

## 2025-06-06 DIAGNOSIS — I10 ESSENTIAL HYPERTENSION: ICD-10-CM

## 2025-06-06 DIAGNOSIS — R74.8 ELEVATED LIVER ENZYMES: ICD-10-CM

## 2025-06-06 PROCEDURE — 3075F SYST BP GE 130 - 139MM HG: CPT | Performed by: FAMILY MEDICINE

## 2025-06-06 PROCEDURE — 4004F PT TOBACCO SCREEN RCVD TLK: CPT | Performed by: FAMILY MEDICINE

## 2025-06-06 PROCEDURE — 99214 OFFICE O/P EST MOD 30 MIN: CPT | Performed by: FAMILY MEDICINE

## 2025-06-06 PROCEDURE — 99396 PREV VISIT EST AGE 40-64: CPT | Performed by: FAMILY MEDICINE

## 2025-06-06 PROCEDURE — 3080F DIAST BP >= 90 MM HG: CPT | Performed by: FAMILY MEDICINE

## 2025-06-06 RX ORDER — OMEPRAZOLE 40 MG/1
CAPSULE, DELAYED RELEASE ORAL
Qty: 23 CAPSULE | Refills: 0 | Status: SHIPPED | OUTPATIENT
Start: 2025-06-06

## 2025-06-06 RX ORDER — MELOXICAM 7.5 MG/1
7.5 TABLET ORAL
Qty: 30 TABLET | Refills: 2 | Status: SHIPPED | OUTPATIENT
Start: 2025-06-06

## 2025-06-06 RX ORDER — LISINOPRIL 20 MG/1
20 TABLET ORAL DAILY
Qty: 23 TABLET | Refills: 0 | Status: SHIPPED | OUTPATIENT
Start: 2025-06-06

## 2025-06-06 NOTE — ASSESSMENT & PLAN NOTE
Reduce NSAID use.  Orders:    lisinopril 20 mg tablet; Take 1 tablet (20 mg) by mouth once daily. for blood pressure    Lipid Panel; Future    Follow Up In Primary Care - Established; Future

## 2025-06-06 NOTE — ASSESSMENT & PLAN NOTE
Well controlled. Try lower dose of Omeprazole 20 mg over-the-counter.  Orders:    omeprazole (PriLOSEC) 40 mg DR capsule; TAKE 1 CAPSULE BY MOUTH ONCE DAILY AS NEEDED    Referral to Gastroenterology; Future

## 2025-06-06 NOTE — PROGRESS NOTES
Subjective   Patient ID: Manuel Humphreys is a 40 y.o. male who presents for Follow-up (Pt presents with care taker for med review, refills needed, no concerns.).  HPI Historian(s): Self and Facility Staff    Generally feeling well.    Has therapist through [facility].    Takes Meloxicam every other day. Consult ortho/sports medicine.     Acid reflux well controlled. Has not tried 20 mg.    Review of Systems   All other systems reviewed and are negative.    No LMP for male patient.    Tobacco Use History[1]  Social History     Substance and Sexual Activity   Alcohol Use Yes    Comment: rarely     Social History     Substance and Sexual Activity   Drug Use Not Currently    Types: Marijuana       Family History[2]    Patient Care Team:  Surya Kaur DO as PCP - General (Family Medicine)  Surya Kaur DO as PCP - [facility] PCP  Surya Kaur DO as PCP - [facility] PCP    RX Allergies[3]    Prior to Admission medications    Medication Sig Start Date End Date Taking? Authorizing Provider   cholecalciferol (Vitamin D3) 10 MCG (400 UNIT) tablet Take 1 tablet (10 mcg) by mouth every other day. 1/8/24  Yes Surya Kaur DO   lisinopril 20 mg tablet Take 1 tablet (20 mg) by mouth once daily. for blood pressure 5/15/25  Yes Surya Kaur DO   meloxicam (Mobic) 15 mg tablet Take 1 tablet by mouth once daily 11/8/24  Yes Surya Kaur DO   omeprazole (PriLOSEC) 40 mg DR capsule TAKE 1 CAPSULE BY MOUTH ONCE DAILY AS NEEDED 5/15/25  Yes Surya Kaur DO   melatonin 3 mg tablet take one tablet nightly as needed for sleep  Patient not taking: Reported on 6/6/2025 6/30/22   Historical Provider, MD        Objective   BP (!) 137/96   Pulse 73   Wt 93.4 kg (206 lb)   SpO2 97%   BMI 32.51 kg/m²     Lab Results   Component Value Date    HGBA1C 5.1 01/09/2023    HGBA1C 5.1 08/02/2021     Lab Results   Component Value Date    LDLCALC 90 12/31/2020    LDLCALC 75 05/20/2020    LDLCALC 90  01/15/2019    LDLF 109 (H) 06/24/2019     Lab Results   Component Value Date    TRIG 102 12/31/2020    TRIG 126 05/20/2020    TRIG 114 06/24/2019      Lab Results   Component Value Date    CREATININE 1.00 05/20/2024    CREATININE 0.99 01/08/2024    CREATININE 1.08 07/18/2023    CREATININE 0.94 01/09/2023    CREATININE 1.03 06/30/2022    CREATININE 1.1 05/06/2022    CREATININE 1.1 08/02/2021    CREATININE 1.1 12/31/2020    CREATININE 0.9 05/20/2020    CREATININE 1.03 05/31/2019     Lab Results   Component Value Date    EGFR >90 05/20/2024    EGFR >90 01/08/2024    EGFR 88 05/06/2022    EGFR 80 08/02/2021    EGFR 80 12/31/2020    EGFR 102 05/20/2020    EGFR 102 01/15/2019    EGFR 90 09/19/2018    EGFR 91 03/19/2018    GFRMALE 89 07/18/2023    GFRMALE >90 01/09/2023    GFRMALE >90 06/30/2022      Lab Results   Component Value Date    TSH 2.24 07/18/2023    TSH 3.76 08/02/2021    TSH 1.21 05/20/2020    TSH 2.07 01/22/2019          Physical Exam  Vitals and nursing note reviewed.   Constitutional:       General: He is not in acute distress.     Appearance: Normal appearance. He is well-developed.      Comments: No assistive device presently being used.   HENT:      Head: Normocephalic and atraumatic.      Nose: Nose normal.   Eyes:      General: No scleral icterus.     Extraocular Movements: Extraocular movements intact.      Conjunctiva/sclera: Conjunctivae normal.   Neck:      Thyroid: No thyromegaly.      Vascular: No carotid bruit or JVD.   Cardiovascular:      Rate and Rhythm: Normal rate and regular rhythm.      Heart sounds: Normal heart sounds.   Pulmonary:      Effort: Pulmonary effort is normal. No respiratory distress.      Breath sounds: Normal breath sounds.   Abdominal:      General: Bowel sounds are normal. There is no distension.      Palpations: Abdomen is soft. There is no mass.      Tenderness: There is no abdominal tenderness. There is no guarding or rebound.   Musculoskeletal:      Cervical back:  Normal range of motion. No tenderness.      Right lower leg: No edema.      Left lower leg: No edema.   Skin:     General: Skin is warm and dry.      Coloration: Skin is not jaundiced.   Neurological:      General: No focal deficit present.      Mental Status: He is alert and oriented to person, place, and time. Mental status is at baseline.   Psychiatric:         Mood and Affect: Mood normal.         Behavior: Behavior normal.         Thought Content: Thought content normal.         Assessment & Plan  Well adult health check  40yM doing fairly well.       Gastroesophageal reflux disease without esophagitis  Well controlled. Try lower dose of Omeprazole 20 mg over-the-counter.  Orders:    omeprazole (PriLOSEC) 40 mg DR capsule; TAKE 1 CAPSULE BY MOUTH ONCE DAILY AS NEEDED    Referral to Gastroenterology; Future    Chronic pain of right knee  Well controlled.   Orders:    meloxicam (Mobic) 7.5 mg tablet; Take 1 tablet (7.5 mg) by mouth every other day if needed for moderate pain (4 - 6).    Referral to Sports Medicine; Future    Essential hypertension  Reduce NSAID use.  Orders:    lisinopril 20 mg tablet; Take 1 tablet (20 mg) by mouth once daily. for blood pressure    Lipid Panel; Future    Follow Up In Primary Care - Established; Future    Elevated liver enzymes  Follow-up with hepatology.  Orders:    Comprehensive Metabolic Panel; Future    Referral to Gastroenterology; Future    Hepatitis B Surface Antigen; Future    Hepatitis B Core Antibody, Total; Future    Hepatitis B Surface Antibody; Future    Hypercalcemia  Primary hyperparathyroidism +/- vitamin D intoxication.  Orders:    Calcium, Ionized; Future    PTH, intact; Future    Vitamin D 25-Hydroxy,Total (for eval of Vitamin D levels); Future    Vitamin D 1,25 Dihydroxy (for eval of hypercalcemia); Future    Primary hyperparathyroidism (Multi)                           [1]   Social History  Tobacco Use   Smoking Status Some Days    Current packs/day: 0.00     Types: Cigarettes    Last attempt to quit: 2022    Years since quittin.8   Smokeless Tobacco Former    Types: Chew   [2]   Family History  Problem Relation Name Age of Onset    Diabetes Father      Coronary artery disease Father  30 - 39   [3]   Allergies  Allergen Reactions    Other Itching

## 2025-06-06 NOTE — ASSESSMENT & PLAN NOTE
Primary hyperparathyroidism +/- vitamin D intoxication.  Orders:    Calcium, Ionized; Future    PTH, intact; Future    Vitamin D 25-Hydroxy,Total (for eval of Vitamin D levels); Future    Vitamin D 1,25 Dihydroxy (for eval of hypercalcemia); Future

## 2025-06-06 NOTE — PATIENT INSTRUCTIONS
Gastroenterology  Dr. Lidia Jackson, Dr. Maribell Holder ([place]) [phone]  Dr. Deloris Caruso, Dr. Katherine Hsu, Dr. Clyde Barbosa, Dr. Edward Montoya ([place]) [phone]    Hepatology  Dr. Bonilla Jimenez ([place]) [phone]  Dr. Víctor Lindsey, Dr. Junior Mathew, Dr. Bryce Boateng, Dr. Kushal Fowler ([place]) [phone]    Orthopedic Surgery [phone]  Dr. Edward Reeder (shoulder, elbow, knee), Dr. Jerry Leyva (hip, knee), Lianne Eaton PA-C, et al. [phone]  Dr. Kendrick Jo (spine), Dr. Codey Rod (spine), Dr. Bc Cole (shoulder, knee), Dr. Tyrell Gordillo (hand, elbow, shoulder), Dr. Andrea Markham (spine), Dr. Ken Garcia (sports med), et al. [phone]  Dr. Bruno Huynh (trauma, oncology) [phone]  Dr. Ebenezer Pierce (shoulder, elbow), Dr. Marques Grajeda (shoulder, elbow, knee), et al. [phone]    [facility] [phone]  Dr. Russell Kent (sports med)  Dr. Bc West (sports med)    Stop vitamin D supplementation. Daily calcium intake should generally be between 800 mg and 1200 mg from all sources, preferably through your diet. You can probably stop calcium supplementation.    Highlights of Adverse Effects Associated with NSAIDs (e.g., ibuprofen, naproxen, celecoxib, diclofenac, meloxicam, et al.)  (Non-Steroidal Anti-Inflammatory Drugs)  Heart attack, heart failure, stroke, increased blood pressure, kidney injury or failure, liver injury or failure, gastritis, gastrointestinal bleeding, other bleeding, effects on blood counts, lung disease. VAUGHAN-2 selective inhibitors (Celebrex/celecoxib) are less likely to (may still) cause gastrointestinal injury, but may carry higher risks for heart attack and stroke. People with known coronary artery disease (e.g., history of heart attack or stroke, angina, or narrowed arteries in the heart  or brain) and people who are at a higher than average risk for these conditions should avoid using VAUGHAN-2 inhibitors in particular. NSAIDs are generally not recommended for people with kidney disease, heart failure, heart disease, or cirrhosis, or for people who take diuretics. Chronic use tends to increase risk. Miscarriage, congenital malformation, low amniotic fluid, and/or serious kidney dysfunction for baby if taken during pregnancy.    Proton Pump Inhibitors (PPI, e.g., Prilosec/omeprazole, Nexium/esomeprazole, Protonix/pantoprazole, and others) may cause vitamin or mineral deficiencies, kidney damage, dementia, stomach polyps, fractures and thinning of the bones (osteoporosis), intestinal infections including C. diff., lupus. Some of these side effects are more likely with chronic use. There are other possible side effects, and it is recommended, as with any medication, to review all possible side effects. Chronic PPI use may be necessary in certain situations (e.g., Garcia esophagus).     Please return for a(n) blood pressure and medication follow-up appointment in 3 months, after tests to review results and options, earlier if any question or concern. Please return for your next Wellness visit in 12 months. Please schedule additional problem-focused appointment(s) to address additional problem(s). Simply mentioning or talking briefly about a problem or concern does not necessarily mean it is currently being addressed. Time constraints dictate that not every problem/concern can always be addressed.    Monitor your resting blood pressure (BP) and heart rate (HR) at home. Resting BP should be fairly consistently lower than 140/90, preferably better than 130/80. Systolic BP (the top number) should generally stay higher than 100. Normal HR range is , as low as 50 might be acceptable. Please bring your blood pressure machine to your appointments. For a list of validated BP machines:  "https://www.validatebp.org/  Recommended vaccines:  Influenza, annual  Prevnar-20 \"pneumonia\" vaccine  Avoid taking Biotin (a vitamin, shows up particularly in hair/nail supplements) for a week prior to any blood tests, as it can interfere with certain results. Fasting for labs means 12 hours, nothing to eat or drink, except water and medications, unless directed otherwise.    For assistance with scheduling referrals or consultations, please call [phone]. For laboratory, radiology, and other tests, please call [phone] ([phone] for pediatrics). Please review prescription inserts and published information for possible adverse effects of all medications. Return after testing or consultation to review results and recommendations, if symptoms persist, change, worsen, or return, or if you have any question or concern. If you do not get results within 7-10 days, or you have any question or concern, please send a message, call the office ([phone]), or return to the office for a follow-up appointment. For non-emergencies, you may call the office. For emergencies, call 9-1-1 or go to the nearest Emergency Department. Please schedule additional appointment(s) to address concern(s) not addressed today. An annual Wellness visit is strongly recommended. A Wellness visit should be dedicated to addressing routine health maintenance matters (e.g., cancer screenings, cardiovascular screening, etc.). Problem-focused visits, typically prompted by symptoms or specific concerns, are usually conducted separately, particularly if multiple or complex problems need to be addressed.    In general, results are not released or discussed over the telephone, but at an appointment or via  [employer]. Results of tests done through [facility] are released via  [employer] (see below).  [email or web address]   [employer] support line: [phone]   "

## 2025-06-06 NOTE — ASSESSMENT & PLAN NOTE
Well controlled.   Orders:    meloxicam (Mobic) 7.5 mg tablet; Take 1 tablet (7.5 mg) by mouth every other day if needed for moderate pain (4 - 6).    Referral to Sports Medicine; Future

## 2025-09-03 ENCOUNTER — APPOINTMENT (OUTPATIENT)
Dept: PRIMARY CARE | Facility: CLINIC | Age: 40
End: 2025-09-03
Payer: COMMERCIAL

## 2025-09-03 ENCOUNTER — TELEPHONE (OUTPATIENT)
Dept: PRIMARY CARE | Facility: CLINIC | Age: 40
End: 2025-09-03
Payer: COMMERCIAL

## 2025-09-03 DIAGNOSIS — K21.9 GASTROESOPHAGEAL REFLUX DISEASE WITHOUT ESOPHAGITIS: ICD-10-CM

## 2025-09-03 DIAGNOSIS — G89.29 CHRONIC PAIN OF RIGHT KNEE: ICD-10-CM

## 2025-09-03 DIAGNOSIS — M25.561 CHRONIC PAIN OF RIGHT KNEE: ICD-10-CM

## 2025-09-03 DIAGNOSIS — E55.9 VITAMIN D DEFICIENCY: ICD-10-CM

## 2025-09-03 DIAGNOSIS — I10 ESSENTIAL HYPERTENSION: ICD-10-CM

## 2025-09-03 RX ORDER — CYANOCOBALAMIN (VITAMIN B-12) 500 MCG
10 TABLET ORAL EVERY OTHER DAY
Qty: 45 TABLET | Refills: 0 | Status: SHIPPED | OUTPATIENT
Start: 2025-09-03

## 2025-09-03 RX ORDER — MELOXICAM 7.5 MG/1
7.5 TABLET ORAL
Qty: 30 TABLET | Refills: 2 | Status: SHIPPED | OUTPATIENT
Start: 2025-09-03 | End: 2025-09-04 | Stop reason: SDUPTHER

## 2025-09-03 RX ORDER — LISINOPRIL 20 MG/1
20 TABLET ORAL DAILY
Qty: 30 TABLET | Refills: 0 | Status: SHIPPED | OUTPATIENT
Start: 2025-09-03 | End: 2025-09-04 | Stop reason: SDUPTHER

## 2025-09-03 RX ORDER — OMEPRAZOLE 40 MG/1
CAPSULE, DELAYED RELEASE ORAL
Qty: 30 CAPSULE | Refills: 0 | Status: SHIPPED | OUTPATIENT
Start: 2025-09-03 | End: 2025-09-04 | Stop reason: SDUPTHER

## 2025-09-08 ENCOUNTER — APPOINTMENT (OUTPATIENT)
Dept: PRIMARY CARE | Facility: CLINIC | Age: 40
End: 2025-09-08
Payer: COMMERCIAL

## 2025-09-26 ENCOUNTER — APPOINTMENT (OUTPATIENT)
Dept: PRIMARY CARE | Facility: CLINIC | Age: 40
End: 2025-09-26
Payer: MEDICARE

## (undated) DEVICE — SUTURE, ETHILON, 4-0, BLK, MONO, PS-2 18

## (undated) DEVICE — Device

## (undated) DEVICE — DRESSING, GAUZE, PETROLATUM, PATCH, XEROFORM, 1 X 8 IN, STERILE

## (undated) DEVICE — DRESSING, NON-ADHERENT, TELFA, OUCHLESS, 3 X 8 IN, STERILE

## (undated) DEVICE — SOLUTION, IRRIGATION, SODIUM CHLORIDE 0.9%, 1000 ML, POUR BOTTLE

## (undated) DEVICE — BANDAGE, ELASTIC, MATRIX, SELF-CLOSURE, 3 IN X 5 YD, LF

## (undated) DEVICE — HOLSTER, BOVIE, ES PENCIL, DISP

## (undated) DEVICE — PREP TRAY, SKIN, DRY, W/GLOVES

## (undated) DEVICE — STOCKINETTE, TUBE, BLN, ST, 1 PLY, 4 X 48 IN, LF